# Patient Record
Sex: MALE | Race: WHITE | NOT HISPANIC OR LATINO | Employment: FULL TIME | URBAN - METROPOLITAN AREA
[De-identification: names, ages, dates, MRNs, and addresses within clinical notes are randomized per-mention and may not be internally consistent; named-entity substitution may affect disease eponyms.]

---

## 2017-01-11 ENCOUNTER — PRE VISIT (OUTPATIENT)
Dept: CARDIOLOGY | Facility: CLINIC | Age: 57
End: 2017-01-11

## 2017-01-11 PROBLEM — E78.2 MIXED HYPERLIPIDEMIA: Status: ACTIVE | Noted: 2017-01-11

## 2017-02-06 ENCOUNTER — PRE VISIT (OUTPATIENT)
Dept: CARDIOLOGY | Facility: CLINIC | Age: 57
End: 2017-02-06

## 2017-02-27 ENCOUNTER — OFFICE VISIT (OUTPATIENT)
Dept: CARDIOLOGY | Facility: CLINIC | Age: 57
End: 2017-02-27
Payer: COMMERCIAL

## 2017-02-27 VITALS
SYSTOLIC BLOOD PRESSURE: 120 MMHG | HEIGHT: 66 IN | DIASTOLIC BLOOD PRESSURE: 80 MMHG | HEART RATE: 60 BPM | WEIGHT: 163 LBS | BODY MASS INDEX: 26.2 KG/M2

## 2017-02-27 DIAGNOSIS — E78.5 HYPERLIPIDEMIA WITH TARGET LDL LESS THAN 130: ICD-10-CM

## 2017-02-27 DIAGNOSIS — I10 ESSENTIAL HYPERTENSION, BENIGN: ICD-10-CM

## 2017-02-27 DIAGNOSIS — E78.5 HYPERLIPIDEMIA LDL GOAL <70: ICD-10-CM

## 2017-02-27 DIAGNOSIS — I25.10 CORONARY ARTERY DISEASE INVOLVING NATIVE CORONARY ARTERY OF NATIVE HEART WITHOUT ANGINA PECTORIS: ICD-10-CM

## 2017-02-27 PROCEDURE — 99214 OFFICE O/P EST MOD 30 MIN: CPT | Performed by: INTERNAL MEDICINE

## 2017-02-27 NOTE — MR AVS SNAPSHOT
After Visit Summary   2/27/2017    El Newell    MRN: [id number]           Patient Information     Date Of Birth          1960        Visit Information        Provider Department      2/27/2017 3:45 PM Mariano Garcia MD [facility]        Today's Diagnoses     Hyperlipidemia with target LDL less than 130        Essential hypertension, benign        Coronary artery disease involving native coronary artery of native heart without angina pectoris        Hyperlipidemia LDL goal <70           Follow-ups after your visit        Additional Services     Follow-Up with Cardiologist                 Future tests that were ordered for you today     Open Future Orders        Priority Expected Expires Ordered    Basic metabolic panel Routine 6/27/2017 2/27/2018 2/27/2017    Lipid Profile Routine 6/27/2017 2/27/2018 2/27/2017    ALT Routine 6/27/2017 2/27/2018 2/27/2017    Echocardiogram Routine 6/27/2017 2/27/2018 2/27/2017    Follow-Up with Cardiologist Routine 6/27/2017 2/27/2018 2/27/2017            Who to contact     If you have questions or need follow up information about today's clinic visit or your schedule please contact [facility] directly at [phone].  Normal or non-critical lab and imaging results will be communicated to you by MyChart, letter or phone within 4 business days after the clinic has received the results. If you do not hear from us within 7 days, please contact the clinic through MyChart or phone. If you have a critical or abnormal lab result, we will notify you by phone as soon as possible.  Submit refill requests through [employer] or call your pharmacy and they will forward the refill request to us. Please allow 3 business days for your refill to be completed.          Additional Information About Your Visit        MyChart Information     [employer] gives you secure access to your  "electronic health record. If you see a primary care provider, you can also send messages to your care team and make appointments. If you have questions, please call your primary care clinic.  If you do not have a primary care provider, please call [phone] and they will assist you.        Care EveryWhere ID     This is your Care EveryWhere ID. This could be used by other organizations to access your [facility] medical records  [id number]        Your Vitals Were     Pulse Height BMI (Body Mass Index)             60 1.676 m (5' 6\") 26.31 kg/m2          Blood Pressure from Last 3 Encounters:   02/27/17 120/80   11/23/16 110/74   10/07/16 100/72    Weight from Last 3 Encounters:   02/27/17 73.9 kg (163 lb)   11/23/16 74.7 kg (164 lb 9.6 oz)   10/07/16 74.4 kg (164 lb)              We Performed the Following     Follow-Up with Cardiologist        Primary Care Provider Office Phone # Fax #    Lee Dave [phone]       [facility] [address] [place] [id number]        Thank you!     Thank you for choosing [facility] [place]  for your care. Our goal is always to provide you with excellent care. Hearing back from our patients is one way we can continue to improve our services. Please take a few minutes to complete the written survey that you may receive in the mail after your visit with us. Thank you!             Your Updated Medication List - Protect others around you: Learn how to safely use, store and throw away your medicines at www.disposemymeds.org.          This list is accurate as of: 2/27/17  4:36 PM.  Always use your most recent med list.                   Brand Name Dispense Instructions for use    aspirin 81 MG tablet      Take 81 mg by mouth daily       atorvastatin 40 MG tablet    LIPITOR    90 tablet    Take 1 tablet (40 mg) by mouth daily (with dinner)       ezetimibe 10 MG tablet    ZETIA    90 tablet    Take 1 tablet (10 " mg) by mouth At Bedtime       irbesartan 150 MG tablet    AVAPRO    180 tablet    Take 1 tablet (150 mg) by mouth 2 times daily       spironolactone 25 MG tablet    ALDACTONE    30 tablet    Take 1 tablet (25 mg) by mouth daily       vitamin B complex with vitamin C Tabs tablet      Take 1 tablet by mouth daily

## 2017-02-27 NOTE — LETTER
2/27/2017    Lee Dave MD  [facility]   [address] [facility] [address]    RE: El Newell       Dear Colleague,    I had the pleasure of seeing El Newell in the [facility].    The patient is a 56-year-old white male who initially presented to cardiology clinic for evaluation of an abnormal CT coronary score showing evidence of atherosclerosis, calcified plaque in multiple vessels as well as a history of multiple coronary artery disease risk factors.  He has had perhaps slight symptoms of chest discomfort when he overexerts himself, but not necessarily consistent.  His coronary disease risk factors is positive for cigarette smoking, discontinued in 2006, hypertension since 2013, hyperlipidemia since 2016.  There is no history of diabetes mellitus.  There is a positive family history of coronary disease.  He has 2 to 3 caffeinated beverages per day as well as 1-3 alcoholic beverages a day.      Surgical history includes an L5-S1 fusion that was done in 2011.  There is no evidence of significant medical illness.        He is able to generally participate in activities, both strength training and cardio, without significant restriction.  Blood pressure control has been problematic.  He has been tried on multiple drugs in the past.  He was intolerant to ACE inhibitors, clonidine, labetalol, metoprolol, amlodipine and mostly at higher doses.  CT calcium score was 1145.9, left main coronary artery was 4.8, left anterior descending 24.6, circumflex coronary artery 356.5, right coronary was 460.  There is a 9 mm lung nodule in the right lung.      Echocardiography 2014 showed intact systolic function with ejection fraction 60%-65% with normal right ventricular size and wall motion.  Pulmonary artery pressure was not assessed.  There has been 1 episode of possible loss of consciousness in the past 3 months.  Blood pressures have been extremely high  at one time upwards of 201/131 with him taking clonidine and lorazepam in an effort to lower his blood pressure.  He also had 1 apparent near-syncopal episode or syncopal episode when getting out of bed quickly.        There is no history of myocardial infarction, congestive heart failure, rheumatic heart disease, congenital heart disease or heart murmur.  On exercise stress testing, the patient was able to go 15 minutes on Dimas protocol to a maximum heart rate of 162 which is 98% of maximum predicted heart rate.  Maximum blood pressure 156/72 giving a rate pressure product of approximately 25,000.  There were no EKG changes of ischemia.  There was evidence of a very small area of mild ischemia in the inferior wall.        There is also an ultrasound of the carotids that showed severe disease in the right internal carotid that was greater than 80%.  CT of the neck shows a severe 90% tandem lesion at the ostium and proximal portion of the right internal carotid.  There was mild left common carotid disease with mild left subclavian disease and moderate ostial left vertebral disease.        He subsequently underwent endarterectomy by Dr. Vaibhav Yoder without complications.  He has had peripheral angiography done with the abdominal aorta showing mild atherosclerotic plaque with no aneurysm or stenosis.  Left renal artery had severe 85% stenosis of the origin was treated with PTCA and stent with a 7 x 15 mm stent.      The patient presents to Cardiology Clinic for followup of ischemic heart disease, hypertension and hyperlipidemia.  The patient had a Holter monitor because of his episodic lightheadedness and had isolated to occasional PVCs that were associated with symptoms of lightheadedness.  He also complains still have some sense of either lightheadedness or fatigue or malaise with his medical therapy.  He says that he remains functional and is feeling better than he did with multiple other medications in the past.   He had a neurologic evaluation that demonstrated no apparent neurologic evidence for his episodes.  He has been checked for orthostatic changes which have not been apparent.  He states he gets adequate sleep, but does feel easily fatigued.      PHYSICAL EXAMINATION:   VITAL SIGNS:  Blood pressure 120/80 with a heart rate of 60 and regular.  Weight was 163 pounds, which is stable.   NECK:  Without jugular venous distention, carotid bruit or palpable thyroid.   CHEST:  Essentially clear to percussion and auscultation with slight decreased breath sounds at the bases.   CARDIAC:  Regular rhythm, soft S4 gallop, 1/6 systolic murmur at the left sternal border with minimal radiation.  There was no diastolic murmur, rub or S3.   EXTREMITIES:  Without cyanosis or edema.     Outpatient Encounter Prescriptions as of 2/27/2017   Medication Sig Dispense Refill     ezetimibe (ZETIA) 10 MG tablet Take 1 tablet (10 mg) by mouth At Bedtime 90 tablet 3     atorvastatin (LIPITOR) 40 MG tablet Take 1 tablet (40 mg) by mouth daily (with dinner) 90 tablet 3     spironolactone (ALDACTONE) 25 MG tablet Take 1 tablet (25 mg) by mouth daily 30 tablet 8     vitamin  B complex with vitamin C (VITAMIN  B COMPLEX) TABS Take 1 tablet by mouth daily       irbesartan (AVAPRO) 150 MG tablet Take 1 tablet (150 mg) by mouth 2 times daily 180 tablet 3     aspirin 81 MG tablet Take 81 mg by mouth daily        No facility-administered encounter medications on file as of 2/27/2017.       CLINICAL IMPRESSION:   1.  Stable cardiac condition.   2.  Atherosclerotic heart disease involving all 3 vessels and elevated CT calcium score with multivessel involvement and stress test only showing was a small area of ischemia in the inferior wall.   3.  Hypertension, well controlled.   4.  Hyperlipidemia, at goal.   5.  Positive family history of coronary disease.   6.  History of questionable syncope of unclear etiology.   7.  Degenerative joint disease.   8.  Right  carotid bruit, resolved, status post carotid endarterectomy for severe right internal carotid disease.   9.  Isolated palpitations and irritable rhythm.      DISCUSSION:  The patient has had multiple interventions including PTCA of the renal artery stenosis as well as carotid endarterectomy.  He has only had evidence of very slight ischemia on vigorous stress testing which should be treatable with his medications, although he is not well-tolerant of beta blockers at goal and presently is only on spironolactone and irbesartan, which he is willing to continue to take.  He may be a candidate for further invasive evaluation with cardiac catheterization and coronary angiography if he has any recurrent symptoms.  He does not appear to have any further syncopal episodes.  He needs close followup of serum lipids, basic metabolic panel and blood pressure.  He is willing to try other lower dose drugs at some point in time, but would prefer to stay on present medical regimen at this time.      RECOMMENDATIONS:   1.  Continue present medications.  Consider a very low dose of Bystolic possibly in the future for better blood pressure control and angina treatment, also could consider Ranexa.   2.  Close followup of serum lipids, basic metabolic panel and blood pressure.   3.  Diet and exercise as tolerated.   4.  Echocardiogram in 4-6 months to follow left ventricular function.   5.  Routine medical followup.   6.  Cardiology followup in 4-6 months.     Again, thank you for allowing me to participate in the care of your patient.      Sincerely,    Mariano Garcia MD     [facility] [place]    cc:      Vaibhav Yoder MD    [facility]   [address], [address]   [place] [address]

## 2017-02-28 NOTE — PROGRESS NOTES
HISTORY OF PRESENT ILLNESS:  The patient is a 56-year-old white male who initially presented to cardiology clinic for evaluation of an abnormal CT coronary score showing evidence of atherosclerosis, calcified plaque in multiple vessels as well as a history of multiple coronary artery disease risk factors.  He has had perhaps slight symptoms of chest discomfort when he overexerts himself, but not necessarily consistent.  His coronary disease risk factors is positive for cigarette smoking, discontinued in 2006, hypertension since 2013, hyperlipidemia since 2016.  There is no history of diabetes mellitus.  There is a positive family history of coronary disease.  He has 2 to 3 caffeinated beverages per day as well as 1-3 alcoholic beverages a day.      Surgical history includes an L5-S1 fusion that was done in 2011.  There is no evidence of significant medical illness.        He is able to generally participate in activities, both strength training and cardio, without significant restriction.  Blood pressure control has been problematic.  He has been tried on multiple drugs in the past.  He was intolerant to ACE inhibitors, clonidine, labetalol, metoprolol, amlodipine and mostly at higher doses.  CT calcium score was 1145.9, left main coronary artery was 4.8, left anterior descending 24.6, circumflex coronary artery 356.5, right coronary was 460.  There is a 9 mm lung nodule in the right lung.      Echocardiography 2014 showed intact systolic function with ejection fraction 60%-65% with normal right ventricular size and wall motion.  Pulmonary artery pressure was not assessed.  There has been 1 episode of possible loss of consciousness in the past 3 months.  Blood pressures have been extremely high at one time upwards of 201/131 with him taking clonidine and lorazepam in an effort to lower his blood pressure.  He also had 1 apparent near-syncopal episode or syncopal episode when getting out of bed quickly.        There  is no history of myocardial infarction, congestive heart failure, rheumatic heart disease, congenital heart disease or heart murmur.  On exercise stress testing, the patient was able to go 15 minutes on Dimas protocol to a maximum heart rate of 162 which is 98% of maximum predicted heart rate.  Maximum blood pressure 156/72 giving a rate pressure product of approximately 25,000.  There were no EKG changes of ischemia.  There was evidence of a very small area of mild ischemia in the inferior wall.        There is also an ultrasound of the carotids that showed severe disease in the right internal carotid that was greater than 80%.  CT of the neck shows a severe 90% tandem lesion at the ostium and proximal portion of the right internal carotid.  There was mild left common carotid disease with mild left subclavian disease and moderate ostial left vertebral disease.        He subsequently underwent endarterectomy by Dr. Vaibhav Yoder without complications.  He has had peripheral angiography done with the abdominal aorta showing mild atherosclerotic plaque with no aneurysm or stenosis.  Left renal artery had severe 85% stenosis of the origin was treated with PTCA and stent with a 7 x 15 mm stent.      The patient presents to Cardiology Clinic for followup of ischemic heart disease, hypertension and hyperlipidemia.  The patient had a Holter monitor because of his episodic lightheadedness and had isolated to occasional PVCs that were associated with symptoms of lightheadedness.  He also complains still have some sense of either lightheadedness or fatigue or malaise with his medical therapy.  He says that he remains functional and is feeling better than he did with multiple other medications in the past.  He had a neurologic evaluation that demonstrated no apparent neurologic evidence for his episodes.  He has been checked for orthostatic changes which have not been apparent.  He states he gets adequate sleep, but does feel  easily fatigued.      PHYSICAL EXAMINATION:   VITAL SIGNS:  Blood pressure 120/80 with a heart rate of 60 and regular.  Weight was 163 pounds, which is stable.   NECK:  Without jugular venous distention, carotid bruit or palpable thyroid.   CHEST:  Essentially clear to percussion and auscultation with slight decreased breath sounds at the bases.   CARDIAC:  Regular rhythm, soft S4 gallop, 1/6 systolic murmur at the left sternal border with minimal radiation.  There was no diastolic murmur, rub or S3.   EXTREMITIES:  Without cyanosis or edema.      CLINICAL IMPRESSION:   1.  Stable cardiac condition.   2.  Atherosclerotic heart disease involving all 3 vessels and elevated CT calcium score with multivessel involvement and stress test only showing was a small area of ischemia in the inferior wall.   3.  Hypertension, well controlled.   4.  Hyperlipidemia, at goal.   5.  Positive family history of coronary disease.   6.  History of questionable syncope of unclear etiology.   7.  Degenerative joint disease.   8.  Right carotid bruit, resolved, status post carotid endarterectomy for severe right internal carotid disease.   9.  Isolated palpitations and irritable rhythm.      DISCUSSION:  The patient has had multiple interventions including PTCA of the renal artery stenosis as well as carotid endarterectomy.  He has only had evidence of very slight ischemia on vigorous stress testing which should be treatable with his medications, although he is not well-tolerant of beta blockers at goal and presently is only on spironolactone and irbesartan, which he is willing to continue to take.  He may be a candidate for further invasive evaluation with cardiac catheterization and coronary angiography if he has any recurrent symptoms.  He does not appear to have any further syncopal episodes.  He needs close followup of serum lipids, basic metabolic panel and blood pressure.  He is willing to try other lower dose drugs at some point  in time, but would prefer to stay on present medical regimen at this time.      RECOMMENDATIONS:   1.  Continue present medications.  Consider a very low dose of Bystolic possibly in the future for better blood pressure control and angina treatment, also could consider Ranexa.   2.  Close followup of serum lipids, basic metabolic panel and blood pressure.   3.  Diet and exercise as tolerated.   4.  Echocardiogram in 4-6 months to follow left ventricular function.   5.  Routine medical followup.   6.  Cardiology followup in 4-6 months.      cc:      Lee Dave MD    [facility]   [address] [address]      Vaibhav Yoder MD    [facility]   [address]   [place] [address]              MARANDA HARRIS MD, [facility]             D: 2017 17:24   T: 2017 18:31   MT: KAVON      Name:     KYLE ROSAS   MRN:      [id number]        Account:      [id number]   :      1960           Service Date: 2017      Document: [id number]

## 2017-05-11 DIAGNOSIS — I10 ESSENTIAL HYPERTENSION, BENIGN: ICD-10-CM

## 2017-05-11 RX ORDER — SPIRONOLACTONE 25 MG/1
TABLET ORAL
Qty: 90 TABLET | Refills: 0 | Status: SHIPPED | OUTPATIENT
Start: 2017-05-11 | End: 2017-09-27

## 2017-05-11 NOTE — TELEPHONE ENCOUNTER
spironolactone      Last Written Prescription Date: 10/24/16  Last Fill Quantity: 30, # refills: 8  Last Office Visit with [facility] [place], [facility] or [facility] prescribing provider: 6/10/16  Next 5 appointments (look out 90 days)     Jul 14, 2017  8:15 AM CDT   Return Visit with Mariano Garcia MD   [facility] [place] ([facility])    [address] [place] [id number] [phone]                   Potassium   Date Value Ref Range Status   10/07/2016 4.5 3.5 - 5.1 mmol/L Final     Creatinine   Date Value Ref Range Status   10/07/2016 1.17 0.70 - 1.30 mg/dL Final     BP Readings from Last 3 Encounters:   02/27/17 120/80   11/23/16 110/74   10/07/16 100/72

## 2017-08-07 DIAGNOSIS — I10 ESSENTIAL HYPERTENSION, BENIGN: ICD-10-CM

## 2017-08-08 RX ORDER — SPIRONOLACTONE 25 MG/1
TABLET ORAL
Qty: 90 TABLET | Refills: 1 | Status: SHIPPED | OUTPATIENT
Start: 2017-08-08 | End: 2017-09-27

## 2017-08-08 NOTE — TELEPHONE ENCOUNTER
Spironolactone 25 mg tablet       Last Written Prescription Date: 5/11/17  Last Fill Quantity: 90, # refills: 0  Last Office Visit with [facility], [facility] or [facility] prescribing provider: 5/11/17  Next 5 appointments (look out 90 days)     Sep 27, 2017  3:15 PM CDT   Return Visit with Mariano Garcia MD   [facility] ([facility])    [address] [place] [id number] [phone]                   Potassium   Date Value Ref Range Status   10/07/2016 4.5 3.5 - 5.1 mmol/L Final     Creatinine   Date Value Ref Range Status   10/07/2016 1.17 0.70 - 1.30 mg/dL Final     BP Readings from Last 3 Encounters:   02/27/17 120/80   11/23/16 110/74   10/07/16 100/72

## 2017-09-05 DIAGNOSIS — E78.5 HYPERLIPIDEMIA WITH TARGET LDL LESS THAN 130: ICD-10-CM

## 2017-09-05 RX ORDER — IRBESARTAN 150 MG/1
150 TABLET ORAL 2 TIMES DAILY
Qty: 180 TABLET | Refills: 1 | Status: SHIPPED | OUTPATIENT
Start: 2017-09-05 | End: 2017-09-14

## 2017-09-12 ENCOUNTER — PRE VISIT (OUTPATIENT)
Dept: CARDIOLOGY | Facility: CLINIC | Age: 57
End: 2017-09-12

## 2017-09-14 DIAGNOSIS — E78.5 HYPERLIPIDEMIA WITH TARGET LDL LESS THAN 130: ICD-10-CM

## 2017-09-14 RX ORDER — IRBESARTAN 300 MG/1
150 TABLET ORAL 2 TIMES DAILY
Qty: 90 TABLET | Refills: 3 | Status: SHIPPED | OUTPATIENT
Start: 2017-09-14 | End: 2018-09-19

## 2017-09-20 ENCOUNTER — HOSPITAL ENCOUNTER (OUTPATIENT)
Dept: CARDIOLOGY | Facility: CLINIC | Age: 57
Discharge: HOME | End: 2017-09-20
Attending: INTERNAL MEDICINE | Admitting: INTERNAL MEDICINE
Payer: COMMERCIAL

## 2017-09-20 DIAGNOSIS — E78.5 HYPERLIPIDEMIA LDL GOAL <70: ICD-10-CM

## 2017-09-20 DIAGNOSIS — I10 ESSENTIAL HYPERTENSION, BENIGN: ICD-10-CM

## 2017-09-20 DIAGNOSIS — E78.5 HYPERLIPIDEMIA WITH TARGET LDL LESS THAN 130: ICD-10-CM

## 2017-09-20 DIAGNOSIS — I25.10 CORONARY ARTERY DISEASE INVOLVING NATIVE CORONARY ARTERY OF NATIVE HEART WITHOUT ANGINA PECTORIS: ICD-10-CM

## 2017-09-20 LAB
ALT SERPL W P-5'-P-CCNC: 28 U/L (ref 5–30)
ANION GAP SERPL CALCULATED.3IONS-SCNC: 13.1 MMOL/L (ref 6–17)
BUN SERPL-MCNC: 15 MG/DL (ref 7–30)
CALCIUM SERPL-MCNC: 9.9 MG/DL (ref 8.5–10.5)
CHLORIDE SERPL-SCNC: 98 MMOL/L (ref 98–107)
CHOLEST SERPL-MCNC: 161 MG/DL
CO2 SERPL-SCNC: 28 MMOL/L (ref 23–29)
CREAT SERPL-MCNC: 1.16 MG/DL (ref 0.7–1.3)
GFR SERPL CREATININE-BSD FRML MDRD: 65 ML/MIN/1.7M2
GLUCOSE SERPL-MCNC: 92 MG/DL (ref 70–105)
HDLC SERPL-MCNC: 72 MG/DL
LDLC SERPL CALC-MCNC: 71 MG/DL
NONHDLC SERPL-MCNC: 89 MG/DL
POTASSIUM SERPL-SCNC: 4.1 MMOL/L (ref 3.5–5.1)
SODIUM SERPL-SCNC: 135 MMOL/L (ref 136–145)
TRIGL SERPL-MCNC: 92 MG/DL

## 2017-09-20 PROCEDURE — 93306 TTE W/DOPPLER COMPLETE: CPT | Mod: 26 | Performed by: INTERNAL MEDICINE

## 2017-09-20 PROCEDURE — 80061 LIPID PANEL: CPT | Performed by: INTERNAL MEDICINE

## 2017-09-20 PROCEDURE — 80048 BASIC METABOLIC PNL TOTAL CA: CPT | Performed by: INTERNAL MEDICINE

## 2017-09-20 PROCEDURE — 84460 ALANINE AMINO (ALT) (SGPT): CPT | Performed by: INTERNAL MEDICINE

## 2017-09-20 PROCEDURE — 36415 COLL VENOUS BLD VENIPUNCTURE: CPT | Performed by: INTERNAL MEDICINE

## 2017-09-20 PROCEDURE — 93306 TTE W/DOPPLER COMPLETE: CPT

## 2017-09-21 ENCOUNTER — TELEPHONE (OUTPATIENT)
Dept: CARDIOLOGY | Facility: CLINIC | Age: 57
End: 2017-09-21

## 2017-09-22 NOTE — TELEPHONE ENCOUNTER
[facility] Prior Authorization Team   Phone: [phone]  Fax: [phone]      PA Initiation    Medication: irbesartan (AVAPRO) 300 MG tablet  Insurance Company: [employer] - Phone [phone] Fax [phone]  Pharmacy Filling the Rx: [facility] [address] [place] [address] [address] [facility]  Filling Pharmacy Phone: [phone]  Filling Pharmacy Fax: [phone]  Start Date: 9/22/2017

## 2017-09-27 ENCOUNTER — OFFICE VISIT (OUTPATIENT)
Dept: CARDIOLOGY | Facility: CLINIC | Age: 57
End: 2017-09-27
Attending: INTERNAL MEDICINE
Payer: COMMERCIAL

## 2017-09-27 VITALS
SYSTOLIC BLOOD PRESSURE: 131 MMHG | HEART RATE: 76 BPM | BODY MASS INDEX: 26.16 KG/M2 | DIASTOLIC BLOOD PRESSURE: 81 MMHG | WEIGHT: 162.8 LBS | HEIGHT: 66 IN

## 2017-09-27 DIAGNOSIS — I25.10 CORONARY ARTERY DISEASE INVOLVING NATIVE CORONARY ARTERY OF NATIVE HEART WITHOUT ANGINA PECTORIS: ICD-10-CM

## 2017-09-27 DIAGNOSIS — I10 ESSENTIAL HYPERTENSION, BENIGN: ICD-10-CM

## 2017-09-27 DIAGNOSIS — E78.5 HYPERLIPIDEMIA LDL GOAL <70: ICD-10-CM

## 2017-09-27 DIAGNOSIS — E78.5 HYPERLIPIDEMIA WITH TARGET LDL LESS THAN 130: ICD-10-CM

## 2017-09-27 PROCEDURE — 99214 OFFICE O/P EST MOD 30 MIN: CPT | Performed by: INTERNAL MEDICINE

## 2017-09-27 RX ORDER — CHLORAL HYDRATE 500 MG
1 CAPSULE ORAL DAILY
COMMUNITY
End: 2019-12-04

## 2017-09-27 NOTE — LETTER
9/27/2017    Lee Dave MD  [address] [facility] [address]    RE: El Newell       Dear Colleague,    I had the pleasure of seeing El Newell in the [facility].    The patient is a 57-year-old white male who initially presented to Cardiology Clinic for evaluation of an abnormal CT coronary angiogram showing evidence of atherosclerosis with calcified plaque in multiple vessels as well as a history of multiple coronary artery disease risk factors.  He has had very slight symptoms of chest discomfort when he overexerted himself, but not necessarily in a consistent manner.  Coronary disease risk factors is positive for cigarette smoking, discontinued in 2006, hypertension since 2013 and hyperlipidemia since 2016.  There is no history of documented diabetes mellitus but there was a positive family history of coronary artery disease.  He would drink 2-3 caffeinated beverages a day as well as 1-3 alcoholic beverages a day.      Surgical history included an L5-S1 fusion was done in 2011 with no evidence of significant other medical illness.        He has been able to be generally participate in activities, both strength training and cardio without restriction.  Blood pressure has been at times problematic.  He has been on multiple drugs in the past with intolerance to ACE inhibitor, clonidine, labetalol, metoprolol, amlodipine, mostly at higher doses.      A CT calcium score was 1145 with left main being 4.8, left anterior descending 24.6, circumflex, 356.5 and right coronary 460.  There was a 9 mm nodule in the right lung.        Echocardiography this month demonstrated a normal-sized left ventricle with normal wall thickness, intact systolic function with ejection fraction 60%-65% with no regional wall motion abnormality present.  There was mild mitral insufficiency, trace aortic insufficiency with sinus bradycardia present.        There has been no history  of documented myocardial infarction, congestive heart failure, rheumatic heart disease, congenital heart disease or heart murmur on previous exercise testing.  He was able to go 15 minutes on Dimas protocol to a maximum heart rate of 162, which is 98% of maximum predicted heart rate.  Maximum blood pressure 156/72 with a rate pressure product of approximately 25,000 with no EKG changes of ischemia with the slightest area of mild ischemia in the inferior wall.        Ultrasound of carotids had shown severe disease in the right internal carotid to greater than 80%.  CT of the neck showed a severe 90% lesion in the ostium and proximal portion of the right internal carotid.  There was mild left carotid disease with mild left subclavian disease and moderate ostial left vertebral disease.        He underwent a carotid endarterectomy without complication.  He has also had peripheral angiography done with the abdominal aorta showing mild atherosclerotic plaque but no aneurysm or stenosis.  Left renal artery had severe 85% stenosis at the origin, which was treated with PTCA and stent without complication.      The patient presents to Cardiology Clinic for followup of his ischemic heart disease, hypertension, and hyperlipidemia.  Holter monitor had been done because of episode of lightheadedness and some isolated to occasional PVCs.  At worse he will get lightheadedness.  He also had been placed on spironolactone for additional blood pressure control.  This was discontinued and his lightheadedness improved.  Neurologic exam was initiated because of lightheadedness and only isolated PVCs with no neurologic pathology noted and no orthostatic change in position.  He does get adequate sleep and feels somewhat weak and fatigued.      PHYSICAL EXAMINATION:   VITAL SIGNS:  Showed a blood pressure 130/80 with a heart rate of 70-80 and regular.  Weight was 162 pounds, which is stable.   NECK:  Without jugular venous distention,  obvious carotid bruit or palpable thyroid.   CHEST:  Essentially clear to percussion and auscultation, with slight decreased breath sounds at the bases.   CARDIAC:  Regular rhythm, soft S4 gallop, 1/6 systolic murmur at the left sternal border with minimal radiation.  No diastolic murmur, rub or S3.   EXTREMITIES:  Without cyanosis or edema.     Outpatient Encounter Prescriptions as of 9/27/2017   Medication Sig Dispense Refill     Coenzyme Q10 (CO Q 10) 100 MG CAPS Take by mouth daily       acetylcysteine (NAC) 500 MG CAPS capsule Take 500 mg by mouth daily       fish oil-omega-3 fatty acids 1000 MG capsule Take 1 g by mouth daily       Probiotic Product (PROBIOTIC ADVANCED) CAPS Take by mouth daily       irbesartan (AVAPRO) 300 MG tablet Take 0.5 tablets (150 mg) by mouth 2 times daily 90 tablet 3     atorvastatin (LIPITOR) 40 MG tablet Take 1 tablet (40 mg) by mouth daily (with dinner) 90 tablet 3     [DISCONTINUED] ezetimibe (ZETIA) 10 MG tablet Take 1 tablet (10 mg) by mouth At Bedtime 90 tablet 3     vitamin  B complex with vitamin C (VITAMIN  B COMPLEX) TABS Take 1 tablet by mouth daily       aspirin 81 MG tablet Take 81 mg by mouth daily        [DISCONTINUED] spironolactone (ALDACTONE) 25 MG tablet TAKE 1 TABLET(25 MG) BY MOUTH DAILY 90 tablet 1     [DISCONTINUED] spironolactone (ALDACTONE) 25 MG tablet TAKE 1 TABLET(25 MG) BY MOUTH DAILY 90 tablet 0     No facility-administered encounter medications on file as of 9/27/2017.       CLINICAL IMPRESSION:   1.  Stable cardiac condition.   2.  Arteriosclerotic cardiovascular disease with plaque involving 2 vessels with elevated CT calcium score and stress testing showing slight ischemia in the inferior wall.   3.  Hypertension, adequate control.   4.  Hyperlipidemia discussion goal.   5.  Positive family history of coronary disease.   6.  History of possible syncope of unclear etiology.   7.  Degenerative joint disease.   8.  Peripheral vascular disease with  right carotid bruit, status post carotid endarterectomy for severe right internal carotid disease.   9.  Isolated palpitations.      The patient's medications at the present time include:     1.  Coenzyme Q10, 100 mg a day.   2.  Nac-Acetyl cysteine 500 mg a day.   2.  Fish Omega 3 acid 1 gram per day.   3.  Probiotic 1 per day.   4.  Avapro 150 mg twice a day.   5.  Zetia 10 mg a day.   6.  Atorvastatin 40 mg a day.   7.  Vitamin B complex 1 tablet a day.   8.  Aspirin 81 mg a day.      LABORATORY DATA:  Showed a cholesterol of 151, HDL 72, LDL 71, triglycerides 92.  Sodium 135, potassium 4.1, BUN 15, creatinine 1.16.  ALT is 28.      DISCUSSION:  The patient has done well from a cardiac viewpoint.  He has had no significant symptoms of angina pectoris or congestive heart failure.  He will require followup of his carotid endarterectomy by Vascular Surgery with probable carotid ultrasound.  He is a candidate for a stress echocardiogram in early 2018 to follow ischemic status of the myocardium and medical efficacy and functional capacity.  He has been intolerant to multiple medications that would normally be used for his ischemic heart disease at this time.  Blood pressure appears reasonably well controlled.  He does tend to work extra hours and put additional stress on himself, but has not had significant symptoms.  He will need close followup of his serum lipids, basic metabolic panel and blood pressure.  Otherwise, he appears to be stable and satisfied with his lifestyle.      RECOMMENDATIONS:   1.  Continue present medications.   2.  Close followup of serum lipids, basic metabolic panel and blood pressure.   3.  Diet and exercise as tolerated.     4.  Stress echocardiography in 2018 to follow the ischemic status of the myocardium and functional capacity.   4.  Routine medical followup.   5.  Cardiology followup in 6 months.            Again, thank you for allowing me to participate in the care of your patient.       Sincerely,    Mariano Garcia MD     [facility]    cc:       Vaibhav Yoder MD    [facility]   [address], [address]   [place] [address]

## 2017-09-27 NOTE — MR AVS SNAPSHOT
After Visit Summary   9/27/2017    El Newell    MRN: [id number]           Patient Information     Date Of Birth          1960        Visit Information        Provider Department      9/27/2017 3:15 PM Mariano Garcia MD [facility]        Today's Diagnoses     Hyperlipidemia with target LDL less than 130        Essential hypertension, benign        Coronary artery disease involving native coronary artery of native heart without angina pectoris        Hyperlipidemia LDL goal <70           Follow-ups after your visit        Additional Services     Follow-Up with Cardiologist                 Future tests that were ordered for you today     Open Future Orders        Priority Expected Expires Ordered    Basic metabolic panel Routine 3/26/2018 9/27/2018 9/27/2017    Lipid Profile Routine 3/26/2018 9/27/2018 9/27/2017    ALT Routine 3/26/2018 9/27/2018 9/27/2017    Exercise Stress Echocardiogram Routine 3/26/2018 9/27/2018 9/27/2017    Follow-Up with Cardiologist Routine 3/26/2018 9/27/2018 9/27/2017            Who to contact     If you have questions or need follow up information about today's clinic visit or your schedule please contact [facility] directly at [phone].  Normal or non-critical lab and imaging results will be communicated to you by MyChart, letter or phone within 4 business days after the clinic has received the results. If you do not hear from us within 7 days, please contact the clinic through MyChart or phone. If you have a critical or abnormal lab result, we will notify you by phone as soon as possible.  Submit refill requests through [employer] or call your pharmacy and they will forward the refill request to us. Please allow 3 business days for your refill to be completed.          Additional Information About Your Visit        [employer] Information     [employer] gives you secure access  "to your electronic health record. If you see a primary care provider, you can also send messages to your care team and make appointments. If you have questions, please call your primary care clinic.  If you do not have a primary care provider, please call [phone] and they will assist you.        Care EveryWhere ID     This is your Care EveryWhere ID. This could be used by other organizations to access your [place] medical records  [id number]        Your Vitals Were     Pulse Height BMI (Body Mass Index)             76 1.676 m (5' 6\") 26.28 kg/m2          Blood Pressure from Last 3 Encounters:   09/27/17 131/81   02/27/17 120/80   11/23/16 110/74    Weight from Last 3 Encounters:   09/27/17 73.8 kg (162 lb 12.8 oz)   02/27/17 73.9 kg (163 lb)   11/23/16 74.7 kg (164 lb 9.6 oz)              We Performed the Following     Follow-Up with Cardiologist        Primary Care Provider Office Phone # Fax #    Lee Dave [phone]       [address] [place] [id number]        Equal Access to Services     [facility] Hospital[facility]ISTEL : Hadii aad ku hadasho Soomaali, waaxda luqadaha, qaybta kaalmada adeegyada, mariza carrizales. So [facility] [phone].    ATENCIÓN: Si habla español, tiene a german disposición servicios gratuitos de asistencia lingüística. Llame al [phone].    We comply with applicable federal civil rights laws and [place] laws. We do not discriminate on the basis of race, color, national origin, age, disability sex, sexual orientation or gender identity.            Thank you!     Thank you for choosing [facility]  for your care. Our goal is always to provide you with excellent care. Hearing back from our patients is one way we can continue to improve our services. Please take a few minutes to complete the written survey that you may receive in the mail after your visit with us. Thank you!             Your Updated " Medication List - Protect others around you: Learn how to safely use, store and throw away your medicines at www.disposemymeds.org.          This list is accurate as of: 9/27/17  3:58 PM.  Always use your most recent med list.                   Brand Name Dispense Instructions for use Diagnosis    aspirin 81 MG tablet      Take 81 mg by mouth daily    Essential hypertension, benign       atorvastatin 40 MG tablet    LIPITOR    90 tablet    Take 1 tablet (40 mg) by mouth daily (with dinner)    Hyperlipidemia LDL goal <70       Co Q 10 100 MG Caps      Take by mouth daily        ezetimibe 10 MG tablet    ZETIA    90 tablet    Take 1 tablet (10 mg) by mouth At Bedtime    Hyperlipidemia LDL goal <70       fish oil-omega-3 fatty acids 1000 MG capsule      Take 1 g by mouth daily        irbesartan 300 MG tablet    AVAPRO    90 tablet    Take 0.5 tablets (150 mg) by mouth 2 times daily    Hyperlipidemia with target LDL less than 130        MG Caps capsule   Generic drug:  acetylcysteine      Take 500 mg by mouth daily        PROBIOTIC ADVANCED Caps      Take by mouth daily        vitamin B complex with vitamin C Tabs tablet      Take 1 tablet by mouth daily

## 2017-09-27 NOTE — PROGRESS NOTES
HISTORY OF PRESENT ILLNESS:  The patient is a 57-year-old white male who initially presented to Cardiology Clinic for evaluation of an abnormal CT coronary angiogram showing evidence of atherosclerosis with calcified plaque in multiple vessels as well as a history of multiple coronary artery disease risk factors.  He has had very slight symptoms of chest discomfort when he overexerted himself, but not necessarily in a consistent manner.  Coronary disease risk factors is positive for cigarette smoking, discontinued in 2006, hypertension since 2013 and hyperlipidemia since 2016.  There is no history of documented diabetes mellitus but there was a positive family history of coronary artery disease.  He would drink 2-3 caffeinated beverages a day as well as 1-3 alcoholic beverages a day.      Surgical history included an L5-S1 fusion was done in 2011 with no evidence of significant other medical illness.        He has been able to be generally participate in activities, both strength training and cardio without restriction.  Blood pressure has been at times problematic.  He has been on multiple drugs in the past with intolerance to ACE inhibitor, clonidine, labetalol, metoprolol, amlodipine, mostly at higher doses.      A CT calcium score was 1145 with left main being 4.8, left anterior descending 24.6, circumflex, 356.5 and right coronary 460.  There was a 9 mm nodule in the right lung.        Echocardiography this month demonstrated a normal-sized left ventricle with normal wall thickness, intact systolic function with ejection fraction 60%-65% with no regional wall motion abnormality present.  There was mild mitral insufficiency, trace aortic insufficiency with sinus bradycardia present.        There has been no history of documented myocardial infarction, congestive heart failure, rheumatic heart disease, congenital heart disease or heart murmur on previous exercise testing.  He was able to go 15 minutes on Dimas  protocol to a maximum heart rate of 162, which is 98% of maximum predicted heart rate.  Maximum blood pressure 156/72 with a rate pressure product of approximately 25,000 with no EKG changes of ischemia with the slightest area of mild ischemia in the inferior wall.        Ultrasound of carotids had shown severe disease in the right internal carotid to greater than 80%.  CT of the neck showed a severe 90% lesion in the ostium and proximal portion of the right internal carotid.  There was mild left carotid disease with mild left subclavian disease and moderate ostial left vertebral disease.        He underwent a carotid endarterectomy without complication.  He has also had peripheral angiography done with the abdominal aorta showing mild atherosclerotic plaque but no aneurysm or stenosis.  Left renal artery had severe 85% stenosis at the origin, which was treated with PTCA and stent without complication.      The patient presents to Cardiology Clinic for followup of his ischemic heart disease, hypertension, and hyperlipidemia.  Holter monitor had been done because of episode of lightheadedness and some isolated to occasional PVCs.  At worse he will get lightheadedness.  He also had been placed on spironolactone for additional blood pressure control.  This was discontinued and his lightheadedness improved.  Neurologic exam was initiated because of lightheadedness and only isolated PVCs with no neurologic pathology noted and no orthostatic change in position.  He does get adequate sleep and feels somewhat weak and fatigued.      PHYSICAL EXAMINATION:   VITAL SIGNS:  Showed a blood pressure 130/80 with a heart rate of 70-80 and regular.  Weight was 162 pounds, which is stable.   NECK:  Without jugular venous distention, obvious carotid bruit or palpable thyroid.   CHEST:  Essentially clear to percussion and auscultation, with slight decreased breath sounds at the bases.   CARDIAC:  Regular rhythm, soft S4 gallop, 1/6  systolic murmur at the left sternal border with minimal radiation.  No diastolic murmur, rub or S3.   EXTREMITIES:  Without cyanosis or edema.      CLINICAL IMPRESSION:   1.  Stable cardiac condition.   2.  Arteriosclerotic cardiovascular disease with plaque involving 2 vessels with elevated CT calcium score and stress testing showing slight ischemia in the inferior wall.   3.  Hypertension, adequate control.   4.  Hyperlipidemia discussion goal.   5.  Positive family history of coronary disease.   6.  History of possible syncope of unclear etiology.   7.  Degenerative joint disease.   8.  Peripheral vascular disease with right carotid bruit, status post carotid endarterectomy for severe right internal carotid disease.   9.  Isolated palpitations.      The patient's medications at the present time include:     1.  Coenzyme Q10, 100 mg a day.   2.  Nac-Acetyl cysteine 500 mg a day.   2.  Fish Omega 3 acid 1 gram per day.   3.  Probiotic 1 per day.   4.  Avapro 150 mg twice a day.   5.  Zetia 10 mg a day.   6.  Atorvastatin 40 mg a day.   7.  Vitamin B complex 1 tablet a day.   8.  Aspirin 81 mg a day.      LABORATORY DATA:  Showed a cholesterol of 151, HDL 72, LDL 71, triglycerides 92.  Sodium 135, potassium 4.1, BUN 15, creatinine 1.16.  ALT is 28.      DISCUSSION:  The patient has done well from a cardiac viewpoint.  He has had no significant symptoms of angina pectoris or congestive heart failure.  He will require followup of his carotid endarterectomy by Vascular Surgery with probable carotid ultrasound.  He is a candidate for a stress echocardiogram in early 2018 to follow ischemic status of the myocardium and medical efficacy and functional capacity.  He has been intolerant to multiple medications that would normally be used for his ischemic heart disease at this time.  Blood pressure appears reasonably well controlled.  He does tend to work extra hours and put additional stress on himself, but has not had  significant symptoms.  He will need close followup of his serum lipids, basic metabolic panel and blood pressure.  Otherwise, he appears to be stable and satisfied with his lifestyle.      RECOMMENDATIONS:   1.  Continue present medications.   2.  Close followup of serum lipids, basic metabolic panel and blood pressure.   3.  Diet and exercise as tolerated.     4.  Stress echocardiography in 2018 to follow the ischemic status of the myocardium and functional capacity.   4.  Routine medical followup.   5.  Cardiology followup in 6 months.      cc:      Lee Dave MD    [facility]   [address] [place] [address]       Vaibhav Yoder MD    [facility]   [address], [address]   [place] [address]         MARANDA HARRIS MD, [facility]             D: 2017 16:34   T: 2017 17:23   MT: KAVON      Name:     KYLE ROSAS   MRN:      [id number]        Account:      [id number]   :      1960           Service Date: 2017      Document: [id number]

## 2017-10-30 DIAGNOSIS — E78.5 HYPERLIPIDEMIA LDL GOAL <70: ICD-10-CM

## 2017-10-30 RX ORDER — EZETIMIBE 10 MG/1
10 TABLET ORAL AT BEDTIME
Qty: 90 TABLET | Refills: 3 | Status: SHIPPED | OUTPATIENT
Start: 2017-10-30 | End: 2018-11-28

## 2017-11-15 ENCOUNTER — TELEPHONE (OUTPATIENT)
Dept: INTERNAL MEDICINE | Facility: CLINIC | Age: 57
End: 2017-11-15

## 2017-12-12 ENCOUNTER — HOSPITAL ENCOUNTER (OUTPATIENT)
Dept: ULTRASOUND IMAGING | Facility: CLINIC | Age: 57
End: 2017-12-12
Attending: SURGERY
Payer: COMMERCIAL

## 2017-12-12 ENCOUNTER — HOSPITAL ENCOUNTER (OUTPATIENT)
Dept: ULTRASOUND IMAGING | Facility: CLINIC | Age: 57
Discharge: HOME | End: 2017-12-12
Attending: SURGERY | Admitting: SURGERY
Payer: COMMERCIAL

## 2017-12-12 DIAGNOSIS — I65.21 STENOSIS OF RIGHT CAROTID ARTERY: ICD-10-CM

## 2017-12-12 DIAGNOSIS — I70.1 RENAL ARTERY STENOSIS (H): ICD-10-CM

## 2017-12-12 PROCEDURE — 93880 EXTRACRANIAL BILAT STUDY: CPT

## 2017-12-12 PROCEDURE — 76775 US EXAM ABDO BACK WALL LIM: CPT

## 2017-12-14 ENCOUNTER — OFFICE VISIT (OUTPATIENT)
Dept: OTHER | Facility: CLINIC | Age: 57
End: 2017-12-14
Attending: SURGERY
Payer: COMMERCIAL

## 2017-12-14 VITALS — HEART RATE: 65 BPM | SYSTOLIC BLOOD PRESSURE: 154 MMHG | DIASTOLIC BLOOD PRESSURE: 90 MMHG

## 2017-12-14 DIAGNOSIS — Z48.812 POSTOP CAROTID ENDARTERECTOMY SURVEILLANCE, ENCOUNTER FOR: Primary | ICD-10-CM

## 2017-12-14 DIAGNOSIS — I15.0 RENOVASCULAR HYPERTENSION: ICD-10-CM

## 2017-12-14 DIAGNOSIS — I71.40 ABDOMINAL AORTIC ANEURYSM (AAA) WITHOUT RUPTURE (H): ICD-10-CM

## 2017-12-14 PROCEDURE — 99211 OFF/OP EST MAY X REQ PHY/QHP: CPT

## 2017-12-14 PROCEDURE — 99214 OFFICE O/P EST MOD 30 MIN: CPT | Mod: ZP | Performed by: SURGERY

## 2017-12-14 NOTE — MR AVS SNAPSHOT
After Visit Summary   12/14/2017    El Newell    MRN: [id number]           Patient Information     Date Of Birth          1960        Visit Information        Provider Department      12/14/2017 3:45 PM Vaibhav Yoder MD [facility] [place]      Today's Diagnoses     Postop carotid endarterectomy surveillance, encounter for    -  1    Renovascular hypertension        Abdominal aortic aneurysm (AAA) without rupture (H)           Follow-ups after your visit        Follow-up notes from your care team     Return in about 18 months (around 6/14/2019).      Who to contact     If you have questions or need follow up information about today's clinic visit or your schedule please contact [facility] directly at [phone].  Normal or non-critical lab and imaging results will be communicated to you by MyChart, letter or phone within 4 business days after the clinic has received the results. If you do not hear from us within 7 days, please contact the clinic through MyChart or phone. If you have a critical or abnormal lab result, we will notify you by phone as soon as possible.  Submit refill requests through [employer] or call your pharmacy and they will forward the refill request to us. Please allow 3 business days for your refill to be completed.          Additional Information About Your Visit        MyChart Information     [employer] gives you secure access to your electronic health record. If you see a primary care provider, you can also send messages to your care team and make appointments. If you have questions, please call your primary care clinic.  If you do not have a primary care provider, please call [phone] and they will assist you.        Care EveryWhere ID     This is your Care EveryWhere ID. This could be used by other organizations to access your [facility] medical records  [id number]         Your Vitals Were     Pulse                   65            Blood Pressure from Last 3 Encounters:   12/14/17 154/90   09/27/17 131/81   02/27/17 120/80    Weight from Last 3 Encounters:   09/27/17 162 lb 12.8 oz (73.8 kg)   02/27/17 163 lb (73.9 kg)   11/23/16 164 lb 9.6 oz (74.7 kg)              Today, you had the following     No orders found for display       Primary Care Provider Office Phone # Fax #    Lee Dave [phone]       [address] [facility] [address]        Equal Access to Services     [facility]: Hadii monica martin hadasho Socarolyn, waaxda luqadaha, qaybta kaalmada adegraysonyada, mariza mcguire . So [facility] [phone].    ATENCIÓN: Si habla español, tiene a german disposición servicios gratuitos de asistencia lingüística. Llame al [phone].    We comply with applicable federal civil rights laws and [place] laws. We do not discriminate on the basis of race, color, national origin, age, disability, sex, sexual orientation, or gender identity.            Thank you!     Thank you for choosing [facility] [place]  for your care. Our goal is always to provide you with excellent care. Hearing back from our patients is one way we can continue to improve our services. Please take a few minutes to complete the written survey that you may receive in the mail after your visit with us. Thank you!             Your Updated Medication List - Protect others around you: Learn how to safely use, store and throw away your medicines at www.disposemymeds.org.          This list is accurate as of: 12/14/17  5:09 PM.  Always use your most recent med list.                   Brand Name Dispense Instructions for use Diagnosis    aspirin 81 MG tablet      Take 81 mg by mouth daily    Essential hypertension, benign       atorvastatin 40 MG tablet    LIPITOR    90 tablet    Take 1 tablet (40 mg) by mouth daily (with dinner)    Hyperlipidemia LDL goal <70        Co Q 10 100 MG Caps      Take by mouth daily        ezetimibe 10 MG tablet    ZETIA    90 tablet    Take 1 tablet (10 mg) by mouth At Bedtime    Hyperlipidemia LDL goal <70       fish oil-omega-3 fatty acids 1000 MG capsule      Take 1 g by mouth daily        irbesartan 300 MG tablet    AVAPRO    90 tablet    Take 0.5 tablets (150 mg) by mouth 2 times daily    Hyperlipidemia with target LDL less than 130        MG Caps capsule   Generic drug:  acetylcysteine      Take 500 mg by mouth daily        PROBIOTIC ADVANCED Caps      Take by mouth daily        vitamin B complex with vitamin C Tabs tablet      Take 1 tablet by mouth daily

## 2017-12-14 NOTE — PROGRESS NOTES
[place] [facility]      El Newell Return to see me today for follow-up of his right CEA performed for high-grade stenosis On 7/26/2016.  We also found extensive plaque within the common carotid artery requiring an extended endarterectomy and vein patch.  He is noted to have very mild disease with the left carotid artery but calcification of the wall.  He is remained asymptomatic.      He had severe renal vascular hypertension.  A left renal stent was placed on 8/12/2016.  This significantly improved his hypertension.      PMH: Medications:  Aspirin, Lipitor, Zetia, Avapro 150 mg b.i.d.            Nonsmoker.            No claudication or cardiac symptoms             Marked improvement of renal vascular hypertension  Following stenting        Renal functions remained stable with a most recent serum creatinine= 1.16.  LDL= 71 on his medications.    Recent cardiac echo revealed ejection fraction of 60-65%      Exam: Alert and appropriate.  Blood pressure 154/90 ( usual less than 120 at home)  Pulse 65 regular   Normal affect.  Well-healed right carotid incision with no bruit   Chest= clear    Cardiovascular=RR with grade 3/6 murmur (New per patient)  Extremities= no edema, normal sensation.              +3 dorsalis pedis pulses bilaterally      Carotid duplex reveals a widely patent right CEA including the common carotid artery more proximally.  Left carotid including the bulb and proximal ICA has a calcified wall but very minimal narrowing essentially normal velocity of 123 cm/s          Renal ultrasound reveals a widely East patent stent and normal parenchymal size of the left kidney.  An incidental small infrarenal AAA is noted measuring 3.3 by 3.3 cm with a length of 4.3 cm.          Impression:  #1  Widely patent right CEA with mild diffuse calcification of left carotid system with no significant narrowing.  Repeat exam in a year and a half.                         #2  Renal vascular  hypertension with excellent response to left renal artery stenting.  Stent remains widely patent.  Normal renal function.  Hypertensive today but most likely due to coming to the clinic during a busy day.  Less concerning with minimal antihypertensive medications and the fact that his blood pressure is normal at home.                          #3   Incidental for renal AAA.  This is very small and of no clinical concern.  We will reevaluate this in a year and one half when following up of his renal stent.                         #4   Good control of risk factors.  LDL goal and nonsmoker.  Noted heart murmur  But Dr. Garcia has been following with normal echo.      Vaibhav Yoder MD  Please route or send letter to:  Primary Care Provider (PCP) and Dr ANTONIA Garcia cardiology

## 2017-12-14 NOTE — LETTER
[facility] [place] [address]  [place] [address]  Phone: [phone]  Fax: [phone]      2017    Re: El Newell - 1960     El Newell Return to see me today for follow-up of his right CEA performed for high-grade stenosis On 2016.  We also found extensive plaque within the common carotid artery requiring an extended endarterectomy and vein patch.  He is noted to have very mild disease with the left carotid artery but calcification of the wall.  He is remained asymptomatic.     He had severe renal vascular hypertension.  A left renal stent was placed on 2016.  This significantly improved his hypertension.      PMH: Medications:  Aspirin, Lipitor, Zetia, Avapro 150 mg b.i.d.            Nonsmoker.            No claudication or cardiac symptoms            Marked improvement of renal vascular hypertension  Following stenting      Renal functions remained stable with a most recent serum creatinine= 1.16.  LDL= 71 on his medications.     Recent cardiac echo revealed ejection fraction of 60-65%     Exam: Alert and appropriate.  Blood pressure 154/90 ( usual less than 120 at home)  Pulse 65 regular   Normal affect.  Well-healed right carotid incision with no bruit  Chest= clear    Cardiovascular=RR with grade 3/6 murmur (New per patient)  Extremities= no edema, normal sensation.  +3 dorsalis pedis pulses bilaterally      Carotid duplex reveals a widely patent right CEA including the common carotid artery more proximally.  Left carotid including the bulb and proximal ICA has a calcified wall but very minimal narrowing essentially normal velocity of 123 cm/s      Renal ultrasound reveals a widely East patent stent and normal parenchymal size of the left kidney.  An incidental small infrarenal AAA is noted measuring 3.3 by 3.3 cm with a length of 4.3 cm.      Impression:  #1  Widely patent right CEA with mild diffuse calcification of left  carotid system with no significant narrowing.  Repeat exam in a year and a half.                          #2  Renal vascular hypertension with excellent response to left renal artery stenting.  Stent remains widely patent.  Normal renal function.  Hypertensive today but most likely due to coming to the clinic during a busy day.  Less concerning with minimal antihypertensive medications and the fact that his blood pressure is normal at home.                           #3   Incidental for renal AAA.  This is very small and of no clinical concern.  We will reevaluate this in a year and one half when following up of his renal stent.                          #4   Good control of risk factors.  LDL goal and nonsmoker.  Noted heart murmur  But Dr. Garcia has been following with normal echo.        Vaibhav Yoder MD

## 2017-12-14 NOTE — NURSING NOTE
"Chief Complaint   Patient presents with     RECHECK      1 year F/U. History of Right carotid endarterectomy & renal stent.Ultrasounds on 12/12/17.       Initial /90 (BP Location: Right arm, Patient Position: Chair, Cuff Size: Adult Large)  Pulse 65 Estimated body mass index is 26.28 kg/(m^2) as calculated from the following:    Height as of 9/27/17: 5' 6\" (1.676 m).    Weight as of 9/27/17: 162 lb 12.8 oz (73.8 kg).  Medication Reconciliation: complete    Face to Face time 8 minutes  Gabriela White CMA      "

## 2018-01-04 DIAGNOSIS — E78.5 HYPERLIPIDEMIA LDL GOAL <70: ICD-10-CM

## 2018-01-04 RX ORDER — ATORVASTATIN CALCIUM 40 MG/1
40 TABLET, FILM COATED ORAL
Qty: 90 TABLET | Refills: 2 | Status: SHIPPED | OUTPATIENT
Start: 2018-01-04 | End: 2018-09-19 | Stop reason: ALTCHOICE

## 2018-01-14 ENCOUNTER — MYC MEDICAL ADVICE (OUTPATIENT)
Dept: INTERNAL MEDICINE | Facility: CLINIC | Age: 58
End: 2018-01-14

## 2018-01-26 ENCOUNTER — OFFICE VISIT (OUTPATIENT)
Dept: INTERNAL MEDICINE | Facility: CLINIC | Age: 58
End: 2018-01-26
Payer: COMMERCIAL

## 2018-01-26 VITALS
OXYGEN SATURATION: 97 % | BODY MASS INDEX: 25.92 KG/M2 | DIASTOLIC BLOOD PRESSURE: 78 MMHG | TEMPERATURE: 97.9 F | HEART RATE: 60 BPM | SYSTOLIC BLOOD PRESSURE: 134 MMHG | WEIGHT: 160.6 LBS

## 2018-01-26 DIAGNOSIS — I70.1 RENAL ARTERY STENOSIS (H): ICD-10-CM

## 2018-01-26 DIAGNOSIS — I1A.0 RESISTANT HYPERTENSION: ICD-10-CM

## 2018-01-26 DIAGNOSIS — J32.0 CHRONIC MAXILLARY SINUSITIS: ICD-10-CM

## 2018-01-26 DIAGNOSIS — M54.16 LUMBAR RADICULOPATHY: Primary | ICD-10-CM

## 2018-01-26 PROCEDURE — 99214 OFFICE O/P EST MOD 30 MIN: CPT | Performed by: INTERNAL MEDICINE

## 2018-01-26 RX ORDER — HYDROCODONE BITARTRATE AND ACETAMINOPHEN 5; 325 MG/1; MG/1
.5-1 TABLET ORAL EVERY 6 HOURS PRN
Qty: 15 TABLET | Refills: 0 | Status: SHIPPED | OUTPATIENT
Start: 2018-01-26 | End: 2018-12-07

## 2018-01-26 RX ORDER — DOXYCYCLINE 100 MG/1
100 CAPSULE ORAL 2 TIMES DAILY
Qty: 56 CAPSULE | Refills: 0 | Status: SHIPPED | OUTPATIENT
Start: 2018-01-26 | End: 2018-02-23

## 2018-01-26 RX ORDER — GABAPENTIN 300 MG/1
CAPSULE ORAL
Qty: 60 CAPSULE | Refills: 0 | Status: SHIPPED | OUTPATIENT
Start: 2018-01-26 | End: 2018-12-07

## 2018-01-26 NOTE — MR AVS SNAPSHOT
After Visit Summary   1/26/2018    El Newell    MRN: [id number]           Patient Information     Date Of Birth          1960        Visit Information        Provider Department      1/26/2018 3:40 PM Lee Dave MD [facility]        Today's Diagnoses     Lumbar radiculopathy    -  1    Chronic maxillary sinusitis          Care Instructions    *  The current back pain spreading into the legs may be a possible disc herniation or spinal stenosis affecting one of the lumbar nerve roots.     *  MRI to evaluate the lower thoracic/lumbar spine.   I will contact the radiology department to contact you to arrange this Xray procedure. (if you have claustrophobia, we can try and order an open sided MRI).       --If you have not heard from me within 1-2 business days after the MRI scan, please contact me ([phone]) to review the results and get recommendations.      --If MRI reveals a specific mechanical cause for your pain (i.e. Herniated disc, spinal stenosis), we will direct you to the proper specialist for further evaluation and treatment.       --If no specific cause for the low back pain is seen on the MRI, then we will treat with pain meds, physical therapy.     *  Gabapentin to help treat the nerve pain.  Take one 300 mg tablet per day in the evening for 2 days, then if no better, then take 1 tablet twice per day.  If still no better after 6-7 days, then take 1 tablet three times per day.   Beware of drowsiness with this medication.     *  Hydrocodone (narcotic pain medication) 1/2 or 1 tablet three to four times per day as needed for severe pain only.   Beware of drowsiness, nausea, vomiting, when taking this medication.  Do not drive, or operate dangerous equipment after taking this.     *  Stop any Aspirin (even the low dose aspirin) until further notice in case we need to send for procedures that involve injections.               SINUSITIS (SINUS  "INFECTION):       *  An antibiotic is possibly indicated at this time.       --Doxycycline 100 mg twice per day for 28 days.      Please remember that antibiotics only kill bacteria, they do not make you feel better in any other way.  The antibiotic is only a part of what you need to do to feel better.      *  Be sure to drink lots of fluids.  If the appetite and intake of food is way down, then at leat try to eat soup.  Dehydration is the thing that causes people to end up in the hospital with viral infections and the flu.     *  Try lozenges (Cepostat, Cepacol, hard candies, Zinc lozenges, etc)    *  Mucinex extended release twice per day on a regular basis for the next few days, then twice per day as needed.  OK to take the regular Mucinex, you may just have to take it 2-3 times per day.      *  Saline nasal spray as often as needed.     Examples of over the counter saline nasal sprays:            *  Relieve congestion/pressure by rinsing out the sinus cavities with the Sinus Rinse Kit or Netti Pot.  These are available at most drug stores.  Be sure to use distilled water with either.                *  Decongestants as needed.  Be sure to take the lowest dose needed.  Take decongestants from only ONE source.  It is possible to inadvertantly take more than the recommended amounts if you take decongestants from multiple products (i.e. Do NOT take Mucinex-D and Claritin-D together)    * Beware of decongestants or medications preparations that have a \"D\", these contain pseudoephedrine or phenylephrine which may raise your blood pressure. If your blood pressure is well controlled and you are not on multiple medications, then you may be able to take small amounts of decongestant without a large chance of side effects, but please monitor your blood pressure and if >140/90 while on the decongestants, then stop those products.       *  If you cannot tolerate decongestants or have been told not to take decongestants, you " can use chlortrimeton (chlorpheniramine) if you react poorly to decongestants.  Always try and use the lowest dose needed.  If you have a low of overnight or early morning allergy symptoms, then try taking you favorite nighttime multi symptom cold reliever medication (such as Nyquil, Vicks Formula 44, etc.)    *  Affrin nasal spray as needed for severe nasal congestion (especially before the airplane trip), but limit the use to no more than 5-7 days out of fear of producing chronic nasal drainage.      *  Tylenol as needed for any headaches of low grade temperatures.     *  Ibuprofen as needed for body aches, fevers, headaches    *  Call the clinic if any changes in the symptoms such as worsening fevers, or the amount and quality of the sputum changes, or if you have any major difficulties breathing, or the symptoms fail to clear for a few weeks.    *  Most upper respiratory infection will clear 1-2 weeks, but it is not uncommon for post viral coughs to last for several weeks, even rarely the entire winter.        *  Use steroid nasal spray (available over the counter)   --typical brands include Flonase (fluticasone) or Nasonex (mometasone) or Nasocort (triamcinolone)    Examples of steroid nasal spray:              --Use 2 sprays into each nostril once per day, every day regardless of how you feel for the next 4-8 weeks, depending on the length of the allergy season.  This type of steroid nasal spray will take at least 10 days to reach full effect, please use it for at least 3 full weeks before deciding if it doesn't work for you.             Follow-ups after your visit        Who to contact     If you have questions or need follow up information about today's clinic visit or your schedule please contact [facility] directly at [phone].  Normal or non-critical lab and imaging results will be communicated to you by MyChart, letter or phone within 4 business days after the clinic has  received the results. If you do not hear from us within 7 days, please contact the clinic through [employer] or phone. If you have a critical or abnormal lab result, we will notify you by phone as soon as possible.  Submit refill requests through [employer] or call your pharmacy and they will forward the refill request to us. Please allow 3 business days for your refill to be completed.          Additional Information About Your Visit        [employer] Information     [employer] gives you secure access to your electronic health record. If you see a primary care provider, you can also send messages to your care team and make appointments. If you have questions, please call your primary care clinic.  If you do not have a primary care provider, please call [phone] and they will assist you.        Care EveryWhere ID     This is your Care EveryWhere ID. This could be used by other organizations to access your [place] medical records  [id number]        Your Vitals Were     Pulse Temperature Pulse Oximetry BMI (Body Mass Index)          60 97.9  F (36.6  C) (Oral) 97% 25.92 kg/m2         Blood Pressure from Last 3 Encounters:   01/26/18 134/78   12/14/17 154/90   09/27/17 131/81    Weight from Last 3 Encounters:   01/26/18 160 lb 9.6 oz (72.8 kg)   09/27/17 162 lb 12.8 oz (73.8 kg)   02/27/17 163 lb (73.9 kg)              Today, you had the following     No orders found for display         Today's Medication Changes          These changes are accurate as of 1/26/18  4:51 PM.  If you have any questions, ask your nurse or doctor.               Start taking these medicines.        Dose/Directions    doxycycline monohydrate 100 MG capsule   Used for:  Chronic maxillary sinusitis   Started by:  Lee Dave MD        Dose:  100 mg   Take 1 capsule (100 mg) by mouth 2 times daily for 28 days   Quantity:  56 capsule   Refills:  0       gabapentin 300 MG capsule   Commonly known as:  NEURONTIN   Used for:  Lumbar  radiculopathy   Started by:  Lee Dave MD        1 tablet at bedtime for 2 days, then 1 tablet twice per day.   Quantity:  60 capsule   Refills:  0       HYDROcodone-acetaminophen 5-325 MG per tablet   Commonly known as:  NORCO   Used for:  Lumbar radiculopathy   Started by:  Lee Dave MD        Dose:  0.5-1 tablet   Take 0.5-1 tablets by mouth every 6 hours as needed for moderate to severe pain maximum 4 tablet(s) per day   Quantity:  15 tablet   Refills:  0            Where to get your medicines      These medications were sent to [id number]  [place], [place] - [address]  [address] [place] [address]     Phone:  [phone]     doxycycline monohydrate 100 MG capsule    gabapentin 300 MG capsule         Some of these will need a paper prescription and others can be bought over the counter.  Ask your nurse if you have questions.     Bring a paper prescription for each of these medications     HYDROcodone-acetaminophen 5-325 MG per tablet                Primary Care Provider Office Phone # Fax #    Lee Dave [phone]       [address] [place] [address]        Equal Access to Services     [facility]: Hadii monica martin hadasho Soomaali, waaxda luqadaha, qaybta kaalmada adeegyada, mariza carrizales. So [facility] [phone].    ATENCIÓN: Si habla español, tiene a german disposición servicios gratuitos de asistencia lingüística. Llame al [phone].    We comply with applicable federal civil rights laws and [place] laws. We do not discriminate on the basis of race, color, national origin, age, disability, sex, sexual orientation, or gender identity.            Thank you!     Thank you for [place] [facility] [place] [place] your [place]. Our goal is always to provide you with excellent care. Hearing back from our patients is one way we can continue to improve our  services. Please take a few minutes to complete the written survey that you may receive in the mail after your visit with us. Thank you!             Your Updated Medication List - Protect others around you: Learn how to safely use, store and throw away your medicines at www.disposemymeds.org.          This list is accurate as of 1/26/18  4:51 PM.  Always use your most recent med list.                   Brand Name Dispense Instructions for use Diagnosis    aspirin 81 MG tablet      Take 81 mg by mouth daily    Essential hypertension, benign       atorvastatin 40 MG tablet    LIPITOR    90 tablet    Take 1 tablet (40 mg) by mouth daily (with dinner)    Hyperlipidemia LDL goal <70       Co Q 10 100 MG Caps      Take by mouth daily        doxycycline monohydrate 100 MG capsule     56 capsule    Take 1 capsule (100 mg) by mouth 2 times daily for 28 days    Chronic maxillary sinusitis       ezetimibe 10 MG tablet    ZETIA    90 tablet    Take 1 tablet (10 mg) by mouth At Bedtime    Hyperlipidemia LDL goal <70       fish oil-omega-3 fatty acids 1000 MG capsule      Take 1 g by mouth daily        gabapentin 300 MG capsule    NEURONTIN    60 capsule    1 tablet at bedtime for 2 days, then 1 tablet twice per day.    Lumbar radiculopathy       HYDROcodone-acetaminophen 5-325 MG per tablet    NORCO    15 tablet    Take 0.5-1 tablets by mouth every 6 hours as needed for moderate to severe pain maximum 4 tablet(s) per day    Lumbar radiculopathy       irbesartan 300 MG tablet    AVAPRO    90 tablet    Take 0.5 tablets (150 mg) by mouth 2 times daily    Hyperlipidemia with target LDL less than 130        MG Caps capsule   Generic drug:  acetylcysteine      Take 500 mg by mouth daily        PROBIOTIC ADVANCED Caps      Take by mouth daily        vitamin B complex with vitamin C Tabs tablet      Take 1 tablet by mouth daily

## 2018-01-26 NOTE — PATIENT INSTRUCTIONS
*  The current back pain spreading into the legs may be a possible disc herniation or spinal stenosis affecting one of the lumbar nerve roots.     *  MRI to evaluate the lower thoracic/lumbar spine.   I will contact the radiology department to contact you to arrange this Xray procedure. (if you have claustrophobia, we can try and order an open sided MRI).       --If you have not heard from me within 1-2 business days after the MRI scan, please contact me ([phone]) to review the results and get recommendations.      --If MRI reveals a specific mechanical cause for your pain (i.e. Herniated disc, spinal stenosis), we will direct you to the proper specialist for further evaluation and treatment.       --If no specific cause for the low back pain is seen on the MRI, then we will treat with pain meds, physical therapy.     *  Gabapentin to help treat the nerve pain.  Take one 300 mg tablet per day in the evening for 2 days, then if no better, then take 1 tablet twice per day.  If still no better after 6-7 days, then take 1 tablet three times per day.   Beware of drowsiness with this medication.     *  Hydrocodone (narcotic pain medication) 1/2 or 1 tablet three to four times per day as needed for severe pain only.   Beware of drowsiness, nausea, vomiting, when taking this medication.  Do not drive, or operate dangerous equipment after taking this.     *  Stop any Aspirin (even the low dose aspirin) until further notice in case we need to send for procedures that involve injections.               SINUSITIS (SINUS INFECTION):       *  An antibiotic is possibly indicated at this time.       --Doxycycline 100 mg twice per day for 28 days.      Please remember that antibiotics only kill bacteria, they do not make you feel better in any other way.  The antibiotic is only a part of what you need to do to feel better.      *  Be sure to drink lots of fluids.  If the appetite and intake of food is way down, then at leat try  "to eat soup.  Dehydration is the thing that causes people to end up in the hospital with viral infections and the flu.     *  Try lozenges (Cepostat, Cepacol, hard candies, Zinc lozenges, etc)    *  Mucinex extended release twice per day on a regular basis for the next few days, then twice per day as needed.  OK to take the regular Mucinex, you may just have to take it 2-3 times per day.      *  Saline nasal spray as often as needed.     Examples of over the counter saline nasal sprays:            *  Relieve congestion/pressure by rinsing out the sinus cavities with the Sinus Rinse Kit or Netti Pot.  These are available at most drug stores.  Be sure to use distilled water with either.                *  Decongestants as needed.  Be sure to take the lowest dose needed.  Take decongestants from only ONE source.  It is possible to inadvertantly take more than the recommended amounts if you take decongestants from multiple products (i.e. Do NOT take Mucinex-D and Claritin-D together)    * Beware of decongestants or medications preparations that have a \"D\", these contain pseudoephedrine or phenylephrine which may raise your blood pressure. If your blood pressure is well controlled and you are not on multiple medications, then you may be able to take small amounts of decongestant without a large chance of side effects, but please monitor your blood pressure and if >140/90 while on the decongestants, then stop those products.       *  If you cannot tolerate decongestants or have been told not to take decongestants, you can use chlortrimeton (chlorpheniramine) if you react poorly to decongestants.  Always try and use the lowest dose needed.  If you have a low of overnight or early morning allergy symptoms, then try taking you favorite nighttime multi symptom cold reliever medication (such as Nyquil, Vicks Formula 44, etc.)    *  Affrin nasal spray as needed for severe nasal congestion (especially before the airplane trip), " but limit the use to no more than 5-7 days out of fear of producing chronic nasal drainage.      *  Tylenol as needed for any headaches of low grade temperatures.     *  Ibuprofen as needed for body aches, fevers, headaches    *  Call the clinic if any changes in the symptoms such as worsening fevers, or the amount and quality of the sputum changes, or if you have any major difficulties breathing, or the symptoms fail to clear for a few weeks.    *  Most upper respiratory infection will clear 1-2 weeks, but it is not uncommon for post viral coughs to last for several weeks, even rarely the entire winter.        *  Use steroid nasal spray (available over the counter)   --typical brands include Flonase (fluticasone) or Nasonex (mometasone) or Nasocort (triamcinolone)    Examples of steroid nasal spray:              --Use 2 sprays into each nostril once per day, every day regardless of how you feel for the next 4-8 weeks, depending on the length of the allergy season.  This type of steroid nasal spray will take at least 10 days to reach full effect, please use it for at least 3 full weeks before deciding if it doesn't work for you.

## 2018-01-26 NOTE — PROGRESS NOTES
"  SUBJECTIVE:   El Newell is a 57 year old male who presents to clinic today for the following health issues:      Back Pain       Duration: 2 weeks        Specific cause: turning/bending    Description:   Location of pain: low back bilateral and middle of back bilateral  Character of pain: stabbing and gnawing  Pain radiation:none and aroud waist  New numbness or weakness in legs, not attributed to pain:  YES- feet and calf area    Was lifting weight with barbells.   When bending over to place barbells on rck, he felt a \"POP\" in the lower thoracic/upper lumbar area and has had constant pain since that time.   Feeling some tingling in the bottoms and sides fo both feet, but no weakness.   No  or GI complaints.     Has seen Chripractor for this this week, but no help  Had plain films done, but those showed nothing.     Pain not that bad when walking, but mostly worse with sitting, laying, sleeping.   Having hard time sleeping due to pain.     FYI: Will be flying to [place] next thursday for conference.       Intensity: Currently 7/10    History:   Pain interferes with job: No  History of back problems: previous spinal surgery - 5 years  Any previous MRI or X-rays:   Sees a specialist for back pain:    Therapies tried without relief:     Alleviating factors:   Improved by: none      Precipitating factors:  Worsened by: Lying Flat and Walking    Functional and Psychosocial Screen (Cam[facility] STarT Back):        Had spinal fusions 2011 of L5/S1 in [facility] by neurosurgeon.   Had radicular pain with foot drop at that time.         Accompanying Signs & Symptoms:  Risk of Fracture:    Risk of Cauda Equina:    Risk of Infection:    Risk of Cancer:    Risk of Ankylosing Spondylitis:  Onset at age <35, male, AND morning back stiffness.                   RESPIRATORY SYMPTOMS      Duration: 3 months    Description  nasal congestion, rhinorrhea, facial pain/pressure and watery eyes    Severity: moderate    Accompanying signs " and symptoms: None    History (predisposing factors):  Sinus infestions    Precipitating or alleviating factors: None    Therapies tried and outcome:  none    Took course of Z pack with immediate improvement of sx for the next week, then sx returned.   Took 10 days of augmentin with immediate improvement for the 2 weeks after, then sx returned.   Sx mainly pressure, some moderate puruelent drainage.   No fever,s no shortness of breath, no dyspnea on exertion.     Problem list and histories reviewed & adjusted, as indicated.  Additional history: as documented        Reviewed and updated as needed this visit by clinical staff  Tobacco  Allergies  Meds  Med Hx  Surg Hx  Fam Hx  Soc Hx      Reviewed and updated as needed this visit by Provider           Past Medical History:  ---------------------------  Past Medical History:   Diagnosis Date     Carotid stenosis, right     s/p right CEA 7/27/16     Coronary artery disease 6-6-16    per Ca+ score = TOTAL CALCIUM SCORE: 1145.9     Gastro-oesophageal reflux disease      Hyperlipidemia LDL goal < 130 8/13/14         Hypertension 8/8/14    hospitalized with hypertensive urgency 8/13/14     Lung nodule 6-6-16    Per Ca+ score - soft tissue results. indeterminate 8 mm LUNG NODULE right lung     Near syncope      Renal artery stenosis (H)     s/p PTA & BMS to left renal artery       Past Surgical History:  ---------------------------  Past Surgical History:   Procedure Laterality Date     APPENDECTOMY       ENDARTERECTOMY CAROTID Right 7/26/2016    Procedure: ENDARTERECTOMY CAROTID;  Surgeon: Vaibhav Yoder MD;  Location: [facility] OR     FUSION SPINE POSTERIOR TWO LEVELS  2011    L5-S1 spine fusion, with instrumentation       Current Medications:  ---------------------------  Current Outpatient Prescriptions   Medication Sig Dispense Refill     atorvastatin (LIPITOR) 40 MG tablet Take 1 tablet (40 mg) by mouth daily (with dinner) 90 tablet 2     ezetimibe  "(ZETIA) 10 MG tablet Take 1 tablet (10 mg) by mouth At Bedtime 90 tablet 3     Coenzyme Q10 (CO Q 10) 100 MG CAPS Take by mouth daily       acetylcysteine (NAC) 500 MG CAPS capsule Take 500 mg by mouth daily       fish oil-omega-3 fatty acids 1000 MG capsule Take 1 g by mouth daily       Probiotic Product (PROBIOTIC ADVANCED) CAPS Take by mouth daily       irbesartan (AVAPRO) 300 MG tablet Take 0.5 tablets (150 mg) by mouth 2 times daily 90 tablet 3     vitamin  B complex with vitamin C (VITAMIN  B COMPLEX) TABS Take 1 tablet by mouth daily       aspirin 81 MG tablet Take 81 mg by mouth daily          Allergies:  -------------  Allergies   Allergen Reactions     Ace Inhibitors Cough     Carvedilol      Palpitations, syncope     Clonidine Other (See Comments)     Mental fatigue, somnolence     Labetalol Other (See Comments)     diffuse weakness, muscle cramps, stocking/glove fasiculations, urinary hesitancy, muscle cramps     Metoprolol Other (See Comments)     Severe fatigue, \"made heart jump\"     Amlodipine Other (See Comments)     Palpitations, dizziness       Social History:  -------------------  Social History     Social History     Marital status:      Spouse name: N/A     Number of children: N/A     Years of education: N/A     Occupational History     psychiatrist Self Employed.     Social History Main Topics     Smoking status: Former Smoker     Packs/day: 1.00     Years: 27.00     Quit date: 8/18/2004     Smokeless tobacco: Never Used     Alcohol use 0.0 oz/week     0 Standard drinks or equivalent per week      Comment: 1-3 drink every night last last night,scotch     Drug use: No     Sexual activity: No     Other Topics Concern     Caffeine Concern Yes     2 cups of coffee a day     Sleep Concern No     Stress Concern No     Weight Concern No     Special Diet Yes     vegetarian diet     Exercise Yes     biking,  walking x5     Bike Helmet No     Seat Belt Yes     Social History Narrative       Family " Medical History:  ------------------------------  Family History   Problem Relation Age of Onset     Hypertension Mother      C.A.D. Mother 55     MI and stent age 55     Coronary Artery Disease Mother      Myocardial Infarction Mother      Heart Surgery Mother      stents     Hypertension Father      C.A.D. Father      MI age 55, stents age 55     Coronary Artery Disease Father      CANCER Father      Myocardial Infarction Father      Heart Surgery Father      stents     Neurologic Disorder Sister 30     progressive supranuclear palsy     Hypertension Sister      Hypertension Sister      Heart Surgery Sister      stent     Hypertension Brother      Coronary Artery Disease Maternal Grandmother      Hypertension Maternal Grandmother      Heart Surgery Maternal Grandmother       during surgery     CANCER Maternal Grandfather      Leukemia Maternal Grandfather      Hypertension Paternal Grandmother      Other - See Comments Paternal Grandmother      embolism     HEART DISEASE Paternal Grandfather          ROS:  REVIEW OF SYSTEMS:    RESP: negative for cough, dyspnea, wheezing, hemoptysis  CV: negative for chest pain, palpitations, PND, [place], orthopnea; reports no changes in their ability to perform physical activity (from cardiovascular standpoint)  GI: negative for dysphagia, N/V, pain, melena, diarrhea and constipation  NEURO: negative for paralysis, incoordination, or focal weakness ; POS for  Numbness/tingling in the bottoms and outside of both feet, no focla weakness.     OBJECTIVE:                                                    /78  Pulse 60  Temp 97.9  F (36.6  C) (Oral)  Wt 160 lb 9.6 oz (72.8 kg)  SpO2 97%  BMI 25.92 kg/m2     GENERAL alert and no distress  EYES:  Normal sclera,conjunctiva, EOMI  HENT: oral and posterior pharynx without lesions or erythema, facies symmetric  NECK: Neck supple. No LAD, without thyroidmegaly or JVD., Carotids without bruits.  RESP: Clear to ausculation  bilaterally without wheezes or crackles. Normal BS in all fields.  CV: RRR normal S1S2 without murmurs, rubs or gallops. PMI normal  LYMPH: no cervical lymph adenopathy appreciated  MS: extremities- no gross deformities of the visible extremities noted, no edema  PSYCH: Alert and oriented times 3; speech- coherent  SKIN:  No obvious significant skin lesions on visible portions of face   BACK:  Pt appears uncomfortable, but not in severe distress.  Pain to palpation of paraspinal lumbar muscles, muscles in spasm, palpation reproduces pain exactly. straight leg-raises negative bilaterally.  Able to move on and off the exam table, no obsevred weakness in the feet or legs with walking, standing, or ambulation. Normal reflexes in the achilles and patellar tendons.   Normal strength, able to push off on both feet and toes.          ASSESSMENT/PLAN:                                                      (M54.16) Lumbar radiculopathy  (primary encounter diagnosis)  Comment: The patient has evidence for lumbar radicular pain.   They need to get an MRI of the spine to further see what is going on and therefore what can be done about it.   If the MRI shows an obvious mechanical problem (i.e. Significant spinal stenosis, herniated disc, etc), then physical therapy will not do much and we will refer to spine surgeon.    If the MRI does not show a specific problem causing the pain, then he will need an aggressive physical therapy regimen at [facility], or [facility],    If he does not have claustrophobia, then get it at [facility].  If he has claustrophobia, then we can order an open sided MRI at either [facility] or [facility].   Told to temporarily stop and NSAIDs or ASA in case an epidural steroid injection is required.  Told to go to the ER immediately for any sudden extreme focal lower extremity weakness or bowel/bladder incontinence.     Offered course of  steroids to treat any acute inflammation around a nerve root/disc, but he declined reporting prior severe anxiety and agitation with prior Medrol Dose pack.   WIll give a few pain tablets ( norco) to use for the most severe pain.  He was not asking for them, but is taking a trip on an airplane to [place] in 6 days.   Plan: gabapentin (NEURONTIN) 300 MG capsule,         HYDROcodone-acetaminophen (NORCO) 5-325 MG per         tablet            (J32.0) Chronic maxillary sinusitis  Comment: completed 2 courses of antibiotic with resolution of sx for the weeks he was on them.   Will give another to cover atypical, for longer.   Refer to ENT if no better.   Add steroid nasal spray  Plan: doxycycline monohydrate 100 MG capsule            (I70.1) Renal artery stenosis (H)  Comment: This condition is currently controlled on the current medical regimen.  Continue current therapy.   Plan:     (I10) Resistant hypertension  Comment: This condition is currently controlled on the current medical regimen.  Continue current therapy.   Doing well since renal artery stenosis stent.   Plan:        See Patient Instructions    CARMELINA KOEHLER M.D., MD  [facility]

## 2018-01-29 ENCOUNTER — MYC MEDICAL ADVICE (OUTPATIENT)
Dept: INTERNAL MEDICINE | Facility: CLINIC | Age: 58
End: 2018-01-29

## 2018-01-30 ENCOUNTER — TRANSFERRED RECORDS (OUTPATIENT)
Dept: HEALTH INFORMATION MANAGEMENT | Facility: CLINIC | Age: 58
End: 2018-01-30

## 2018-01-31 ENCOUNTER — MYC MEDICAL ADVICE (OUTPATIENT)
Dept: INTERNAL MEDICINE | Facility: CLINIC | Age: 58
End: 2018-01-31

## 2018-01-31 NOTE — TELEPHONE ENCOUNTER
See MyChart message   I sent copy of report to him at his email and sent copy to be scanned into chart.

## 2018-02-27 ENCOUNTER — TRANSFERRED RECORDS (OUTPATIENT)
Dept: HEALTH INFORMATION MANAGEMENT | Facility: CLINIC | Age: 58
End: 2018-02-27

## 2018-03-07 ENCOUNTER — TRANSFERRED RECORDS (OUTPATIENT)
Dept: HEALTH INFORMATION MANAGEMENT | Facility: CLINIC | Age: 58
End: 2018-03-07

## 2018-06-14 ENCOUNTER — PRE VISIT (OUTPATIENT)
Dept: CARDIOLOGY | Facility: CLINIC | Age: 58
End: 2018-06-14

## 2018-07-11 ENCOUNTER — TELEPHONE (OUTPATIENT)
Dept: CARDIOLOGY | Facility: CLINIC | Age: 58
End: 2018-07-11

## 2018-07-11 NOTE — TELEPHONE ENCOUNTER
Per scheduling, Please extend stress test and labs ordered by Dr. Garcia.  Patient has f/u OV with Dr. Thompson 9-19-18.   Orders extended.

## 2018-09-11 DIAGNOSIS — I25.10 CORONARY ARTERY DISEASE INVOLVING NATIVE CORONARY ARTERY OF NATIVE HEART WITHOUT ANGINA PECTORIS: ICD-10-CM

## 2018-09-11 DIAGNOSIS — E78.5 HYPERLIPIDEMIA WITH TARGET LDL LESS THAN 130: ICD-10-CM

## 2018-09-11 DIAGNOSIS — I10 ESSENTIAL HYPERTENSION, BENIGN: ICD-10-CM

## 2018-09-11 DIAGNOSIS — E78.5 HYPERLIPIDEMIA LDL GOAL <70: ICD-10-CM

## 2018-09-11 LAB
ALT SERPL W P-5'-P-CCNC: 6 U/L (ref 5–30)
ANION GAP SERPL CALCULATED.3IONS-SCNC: 12.3 MMOL/L (ref 6–17)
BUN SERPL-MCNC: 19 MG/DL (ref 7–30)
CALCIUM SERPL-MCNC: 10.5 MG/DL (ref 8.5–10.5)
CHLORIDE SERPL-SCNC: 103 MMOL/L (ref 98–107)
CHOLEST SERPL-MCNC: 171 MG/DL
CO2 SERPL-SCNC: 29 MMOL/L (ref 23–29)
CREAT SERPL-MCNC: 1.03 MG/DL (ref 0.7–1.3)
GFR SERPL CREATININE-BSD FRML MDRD: 74 ML/MIN/1.7M2
GLUCOSE SERPL-MCNC: 96 MG/DL (ref 70–105)
HDLC SERPL-MCNC: 68 MG/DL
LDLC SERPL CALC-MCNC: 76 MG/DL
NONHDLC SERPL-MCNC: 103 MG/DL
POTASSIUM SERPL-SCNC: 4.3 MMOL/L (ref 3.5–5.1)
SODIUM SERPL-SCNC: 140 MMOL/L (ref 136–145)
TRIGL SERPL-MCNC: 137 MG/DL

## 2018-09-11 PROCEDURE — 36415 COLL VENOUS BLD VENIPUNCTURE: CPT | Performed by: INTERNAL MEDICINE

## 2018-09-11 PROCEDURE — 84460 ALANINE AMINO (ALT) (SGPT): CPT | Performed by: INTERNAL MEDICINE

## 2018-09-11 PROCEDURE — 80048 BASIC METABOLIC PNL TOTAL CA: CPT | Performed by: INTERNAL MEDICINE

## 2018-09-11 PROCEDURE — 80061 LIPID PANEL: CPT | Performed by: INTERNAL MEDICINE

## 2018-09-19 ENCOUNTER — OFFICE VISIT (OUTPATIENT)
Dept: CARDIOLOGY | Facility: CLINIC | Age: 58
End: 2018-09-19
Payer: COMMERCIAL

## 2018-09-19 ENCOUNTER — TELEPHONE (OUTPATIENT)
Dept: CARDIOLOGY | Facility: CLINIC | Age: 58
End: 2018-09-19

## 2018-09-19 VITALS
DIASTOLIC BLOOD PRESSURE: 82 MMHG | BODY MASS INDEX: 25.2 KG/M2 | HEART RATE: 78 BPM | WEIGHT: 156.8 LBS | HEIGHT: 66 IN | SYSTOLIC BLOOD PRESSURE: 131 MMHG

## 2018-09-19 DIAGNOSIS — E78.2 MIXED HYPERLIPIDEMIA: ICD-10-CM

## 2018-09-19 DIAGNOSIS — I1A.0 RESISTANT HYPERTENSION: ICD-10-CM

## 2018-09-19 DIAGNOSIS — I25.10 CORONARY ARTERY DISEASE INVOLVING NATIVE CORONARY ARTERY OF NATIVE HEART WITHOUT ANGINA PECTORIS: Primary | ICD-10-CM

## 2018-09-19 DIAGNOSIS — E78.5 HYPERLIPIDEMIA WITH TARGET LDL LESS THAN 130: ICD-10-CM

## 2018-09-19 DIAGNOSIS — I70.1 RENAL ARTERY STENOSIS (H): ICD-10-CM

## 2018-09-19 PROCEDURE — 99215 OFFICE O/P EST HI 40 MIN: CPT | Performed by: INTERNAL MEDICINE

## 2018-09-19 PROCEDURE — 93000 ELECTROCARDIOGRAM COMPLETE: CPT | Performed by: INTERNAL MEDICINE

## 2018-09-19 RX ORDER — ROSUVASTATIN CALCIUM 20 MG/1
20 TABLET, COATED ORAL DAILY
Qty: 90 TABLET | Refills: 3 | Status: SHIPPED | OUTPATIENT
Start: 2018-09-19 | End: 2019-10-07

## 2018-09-19 RX ORDER — IRBESARTAN 300 MG/1
150 TABLET ORAL 2 TIMES DAILY
Qty: 90 TABLET | Refills: 3 | Status: SHIPPED | OUTPATIENT
Start: 2018-09-19 | End: 2018-12-07

## 2018-09-19 NOTE — MR AVS SNAPSHOT
After Visit Summary   9/19/2018    El Newell    MRN: [id number]           Patient Information     Date Of Birth          1960        Visit Information        Provider Department      9/19/2018 8:15 AM Montana Thompson MD [facility]        Today's Diagnoses     Coronary artery disease involving native coronary artery of native heart without angina pectoris    -  1    Mixed hyperlipidemia        Renal artery stenosis (H)        Hyperlipidemia with target LDL less than 130        Resistant hypertension           Follow-ups after your visit        Additional Services     Follow-Up with Cardiologist                 Future tests that were ordered for you today     Open Future Orders        Priority Expected Expires Ordered    Lipid Profile Routine 1/17/2019 9/19/2019 9/19/2018    Basic metabolic panel Routine 9/19/2019 9/20/2019 9/19/2018    Lipid Profile Routine 9/19/2019 9/20/2019 9/19/2018    Follow-Up with Cardiologist Routine 9/19/2019 9/20/2019 9/19/2018            Who to contact     If you have questions or need follow up information about today's clinic visit or your schedule please contact [facility] directly at [phone].  Normal or non-critical lab and imaging results will be communicated to you by [employer], letter or phone within 4 business days after the clinic has received the results. If you do not hear from us within 7 days, please contact the clinic through [employer] or phone. If you have a critical or abnormal lab result, we will notify you by phone as soon as possible.  Submit refill requests through [email or web address] or call your pharmacy and they will forward the refill request to us. Please allow 3 business days for your refill to be completed.          Additional Information About Your Visit        MyChart Information     [email or web address] gives you secure access to your electronic health record. If you see a  "primary care provider, you can also send messages to your care team and make appointments. If you have questions, please call your primary care clinic.  If you do not have a primary care provider, please call [phone] and they will assist you.        Care EveryWhere ID     This is your Care EveryWhere ID. This could be used by other organizations to access your [facility] medical records  [id number]        Your Vitals Were     Pulse Height BMI (Body Mass Index)             78 1.676 m (5' 6\") 25.31 kg/m2          Blood Pressure from Last 3 Encounters:   09/19/18 131/82   01/26/18 134/78   12/14/17 154/90    Weight from Last 3 Encounters:   09/19/18 71.1 kg (156 lb 12.8 oz)   01/26/18 72.8 kg (160 lb 9.6 oz)   09/27/17 73.8 kg (162 lb 12.8 oz)              We Performed the Following     EKG 12-lead complete w/read - Clinics          Today's Medication Changes          These changes are accurate as of 9/19/18  9:12 AM.  If you have any questions, ask your nurse or doctor.               Start taking these medicines.        Dose/Directions    rosuvastatin 20 MG tablet   Commonly known as:  CRESTOR   Used for:  Coronary artery disease involving native coronary artery of native heart without angina pectoris, Mixed hyperlipidemia   Started by:  Montana Thompson MD        Dose:  20 mg   Take 1 tablet (20 mg) by mouth daily   Quantity:  90 tablet   Refills:  3         Stop taking these medicines if you haven't already. Please contact your care team if you have questions.     atorvastatin 40 MG tablet   Commonly known as:  LIPITOR   Stopped by:  Montana Thompson MD                Where to get your medicines      These medications were sent to [id number]  [place] - [address]  [address] [place] [address]     Phone:  [phone]     irbesartan 300 MG tablet    rosuvastatin 20 MG tablet                Primary Care Provider Office Phone # Fax # "    Lee Dave [phone]       [address] [facility] [address]        Equal Access to Services     LIBORIO RASMUSSEN : Hadruslan aad ku hadlluvia Espinosacarolyn, camilleda xuraphaelha, toby jasminlima dennyisabella, mariza borrego dennygrayson nickerson laAustinamber carrizales. So [facility] [phone].    ATENCIÓN: Si habla español, tiene a german disposición servicios gratuitos de asistencia lingüística. Llame al [phone].    We comply with applicable federal civil rights laws and [place] laws. We do not discriminate on the basis of race, color, national origin, age, disability, sex, sexual orientation, or gender identity.            Thank you!     Thank you for choosing [facility]  for your care. Our goal is always to provide you with excellent care. Hearing back from our patients is one way we can continue to improve our services. Please take a few minutes to complete the written survey that you may receive in the mail after your visit with us. Thank you!             Your Updated Medication List - Protect others around you: Learn how to safely use, store and throw away your medicines at www.disposemymeds.org.          This list is accurate as of 9/19/18  9:12 AM.  Always use your most recent med list.                   Brand Name Dispense Instructions for use Diagnosis    aspirin 81 MG tablet      Take 81 mg by mouth daily    Essential hypertension, benign       Co Q 10 100 MG Caps      Take by mouth daily        ezetimibe 10 MG tablet    ZETIA    90 tablet    Take 1 tablet (10 mg) by mouth At Bedtime    Hyperlipidemia LDL goal <70       fish oil-omega-3 fatty acids 1000 MG capsule      Take 1 g by mouth daily        gabapentin 300 MG capsule    NEURONTIN    60 capsule    1 tablet at bedtime for 2 days, then 1 tablet twice per day.    Lumbar radiculopathy       HYDROcodone-acetaminophen 5-325 MG per tablet    NORCO    15 tablet    Take 0.5-1 tablets by mouth every 6 hours as needed for  moderate to severe pain maximum 4 tablet(s) per day    Lumbar radiculopathy       irbesartan 300 MG tablet    AVAPRO    90 tablet    Take 0.5 tablets (150 mg) by mouth 2 times daily    Hyperlipidemia with target LDL less than 130       MULTIVITAMIN ADULT PO      Take by mouth daily         MG Caps capsule   Generic drug:  acetylcysteine      Take 500 mg by mouth daily        PROBIOTIC ADVANCED Caps      Take by mouth daily        rosuvastatin 20 MG tablet    CRESTOR    90 tablet    Take 1 tablet (20 mg) by mouth daily    Coronary artery disease involving native coronary artery of native heart without angina pectoris, Mixed hyperlipidemia       vitamin B complex with vitamin C Tabs tablet      Take 1 tablet by mouth daily

## 2018-09-19 NOTE — TELEPHONE ENCOUNTER
"Per Dr. Thompson's request, called to radiology and requested radiologist review renal ultrasounds on 12/22/16 and 6/21/16, as well as CT of abdomen done on 7/5/16 to check for presence of aortic aneurysm. Aortic aneurysm measuring 3.3 cm was noted on renal ultrasound on 12/12/17. Received return call from Dr. Kulkarni, who stated he reviewed the previous studies and no aneurysm was present. Dr. Kulkarni reported at time of CT scan on 7/5/16, abdominal aorta measured \"just under 3 cm.\" Dr. Thompson made aware. Per review of chart, Dr. Yoder saw pt on 12/14/17 and per note, plans to follow the AAA at the time of his next renal artery imaging. Dr. Thompson made aware, will not order further imaging at this time. Called to pt, no answer. Left detailed VM that no additional imaging will be ordered at this time as plan is for pt to follow up on AAA with Dr. Yoder. Left Team 1 call back number if pt has any questions.   "

## 2018-09-19 NOTE — LETTER
9/19/2018    Lee Dave MD  [address] [facility] [address]    RE: El Newell       Dear Colleague,    I had the pleasure of seeing El Newell in the [facility].    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   He initially had an abnormal CT coronary angiogram around 2016 showing evidence of atherosclerosis with calcified plaque in multiple vessels as well as a history of multiple coronary artery disease risk factors.  He has had very slight symptoms of chest discomfort when he overexerted himself, but not necessarily in a consistent manner.  Coronary disease risk factors are positive for cigarette smoking, discontinued in 2006, hypertension since 2013 and hyperlipidemia since 2016.     He had rather severe dyslipidemia with LDLs averaging 165-170 prior to treatment .  there is no history of documented diabetes mellitus but there was a positive family history of premature coronary artery disease in both his mother and father in their mid 50s.           He was subsequently found to have severe renal artery stenosis and underwent successful left renal artery stenting at that time.  He also had severe right carotid atherosclerosis and had successful right CEA in 2016.  A stress test at that time was able to go 15 minutes on a Dimas protocol without symptoms..  He had a normal  stress EKG, stress nuclear perfusion had a question of small inferior defect.     This last year he has noted he continues to be able to participate in all activities, both strength training and cardio without restriction.     His blood pressure significantly improved after renal artery stenting and in fact his had  to cut back on his antihypertensives because he was having severe orthostasis.  On his current regimen he states his blood pressures are typically in the 120-130 systolic range and is having no orthostasis.  He denies orthopnea, PND, edema.  No palpitations dizziness syncope or near syncope.  Denies claudication or focal neurologic symptoms.  He does note that he likes to lift weights but after each weightlifting session he has quite significant myalgias persist for up to 2 days or more.  He did not have this when he was not on statins.     He has been on multiple drugs in the past with intolerance to ACE inhibitor, clonidine, labetalol, metoprolol, amlodipine, mostly at higher doses.  He has been intolerant to simvastatin due to myalgias.  As noted he still has some post weightlifting myalgias of concern on atorvastatin.        In December 2017 renal ultrasound showed a widely patent stent.  It incidentally reported a 3.3 cm abdominal aortic aneurysm.  This was not commented on in mid 2016 at the time of another renal ultrasound and abdominal CTA.  This will require comparison to see if there is been a change.  I have requested that radiology we reviewed the studies to let me know if there is been a interval change.  He is not having any abdominal discomfort.  I personally reviewed his abdominal CT scan from June 2016.  I do not see evidence of a abdominal aortic infrarenal aneurysm greater than 25 mm on that study.      PHYSICAL EXAMINATION-full exam below.  In summary:   VITAL SIGNS:  Showed a blood pressure 131/82, pulse 65 and regular, weight was 156 pounds, which is stable.   NECK:  Without jugular venous distention, obvious carotid bruit or palpable thyroid.  Healed right CEA incision  CHEST:    Clear.  Normal effort  CARDIAC:  Regular rhythm, 1/6 systolic murmur at the base left sternal border and apex, appears unchanged from previous reports. No diastolic murmur, rub or S3.   EXTREMITIES:  Without cyanosis  or edema.  Pulses full and without bruits.    -No abdominal bruit      Recent fasting lipid profile is reviewed with him.  LDL 76, non-.  HDL 68.  Triglycerides 137.      CLINICAL IMPRESSION:   1.     Coronary artery disease, no  angina at this time.  Young gentleman who we need to continue intense risk factor intervention.  Blood pressure controlled, lipids greater than 50% reduction in LDL and non-HDL at around 100.  No tobacco.  Excellent activity profile and exercise.  Normal fasting blood glucose recently.  Normal renal function    2.     Status post renal artery stenting.  Recent ultrasound showed widely patent stent    3.  History of resistant hypertension now with Hypertension, adequate control after renal stenting  .   4.  Hyperlipidemia  at reasonable goal.  However having what are likely side effects of atorvastatin.  He is interested in, and I have recommended, a trial of switch to rosuvastatin.  We will start at 20 mg daily.  If he tolerates this with improved symptoms for 2 months I will have him increase to 40 mg and after 2 months of that if he continues to tolerate it we will check a lipid profile.  We will see if we need to continue ezetimibe on top of that.    5.  Peripheral vascular disease with right carotid bruit, status post carotid endarterectomy for severe right internal carotid disease.     6.  Modest abdominal aortic aneurysm noted recently in December 2017.  It was not reported in June 2016.  No symptoms from this.  I will have his images reviewed to see if there is been any interval change.  If there has we will need a follow-up study at this time as it is been more than 6 months since the last study          Pending the results of the review of his ultrasound, I have switched him to rosuvastatin as above.  Continue remainder of cardiovascular regimen.  Follow-up lipids in 4 months on rosuvastatin.  Office visit follow-up in a year.    Orders Placed This Encounter   Procedures      Basic metabolic panel     Lipid Profile     Lipid Profile     Follow-Up with Cardiologist     EKG 12-lead complete w/read - Clinics       Orders Placed This Encounter   Medications     Multiple Vitamins-Minerals (MULTIVITAMIN ADULT PO)     Sig: Take by mouth daily     irbesartan (AVAPRO) 300 MG tablet     Sig: Take 0.5 tablets (150 mg) by mouth 2 times daily     Dispense:  90 tablet     Refill:  3     rosuvastatin (CRESTOR) 20 MG tablet     Sig: Take 1 tablet (20 mg) by mouth daily     Dispense:  90 tablet     Refill:  3       Medications Discontinued During This Encounter   Medication Reason     atorvastatin (LIPITOR) 40 MG tablet Alternate therapy     irbesartan (AVAPRO) 300 MG tablet Reorder         Encounter Diagnoses   Name Primary?     Coronary artery disease involving native coronary artery of native heart without angina pectoris Yes     Mixed hyperlipidemia      Renal artery stenosis (H)      Hyperlipidemia with target LDL less than 130      Resistant hypertension        CURRENT MEDICATIONS:  Current Outpatient Prescriptions   Medication Sig Dispense Refill     aspirin 81 MG tablet Take 81 mg by mouth daily        Coenzyme Q10 (CO Q 10) 100 MG CAPS Take by mouth daily       ezetimibe (ZETIA) 10 MG tablet Take 1 tablet (10 mg) by mouth At Bedtime 90 tablet 3     fish oil-omega-3 fatty acids 1000 MG capsule Take 1 g by mouth daily       irbesartan (AVAPRO) 300 MG tablet Take 0.5 tablets (150 mg) by mouth 2 times daily 90 tablet 3     Multiple Vitamins-Minerals (MULTIVITAMIN ADULT PO) Take by mouth daily       rosuvastatin (CRESTOR) 20 MG tablet Take 1 tablet (20 mg) by mouth daily 90 tablet 3     acetylcysteine (NAC) 500 MG CAPS capsule Take 500 mg by mouth daily       gabapentin (NEURONTIN) 300 MG capsule 1 tablet at bedtime for 2 days, then 1 tablet twice per day. (Patient not taking: Reported on 9/19/2018) 60 capsule 0     HYDROcodone-acetaminophen (NORCO) 5-325 MG per tablet Take 0.5-1 tablets by mouth  "every 6 hours as needed for moderate to severe pain maximum 4 tablet(s) per day (Patient not taking: Reported on 9/19/2018) 15 tablet 0     Probiotic Product (PROBIOTIC ADVANCED) CAPS Take by mouth daily       vitamin  B complex with vitamin C (VITAMIN  B COMPLEX) TABS Take 1 tablet by mouth daily       [DISCONTINUED] irbesartan (AVAPRO) 300 MG tablet Take 0.5 tablets (150 mg) by mouth 2 times daily 90 tablet 3       ALLERGIES     Allergies   Allergen Reactions     Simvastatin Muscle Pain (Myalgia)     Ace Inhibitors Cough     Carvedilol      Palpitations, syncope     Clonidine Other (See Comments)     Mental fatigue, somnolence     Labetalol Other (See Comments)     diffuse weakness, muscle cramps, stocking/glove fasiculations, urinary hesitancy, muscle cramps     Metoprolol Other (See Comments)     Severe fatigue, \"made heart jump\"     Amlodipine Other (See Comments)     Palpitations, dizziness       PAST MEDICAL HISTORY:  Past Medical History:   Diagnosis Date     Carotid stenosis, right     s/p right CEA 7/27/16     Coronary artery disease 6-6-16    per Ca+ score = TOTAL CALCIUM SCORE: 1145.9     Gastro-oesophageal reflux disease      Hyperlipidemia LDL goal < 130 8/13/14         Hypertension 8/8/14    hospitalized with hypertensive urgency 8/13/14     Lung nodule 6-6-16    Per Ca+ score - soft tissue results. indeterminate 8 mm LUNG NODULE right lung     Near syncope      Renal artery stenosis (H)     s/p PTA & BMS to left renal artery       PAST SURGICAL HISTORY:  Past Surgical History:   Procedure Laterality Date     APPENDECTOMY       ENDARTERECTOMY CAROTID Right 7/26/2016    Procedure: ENDARTERECTOMY CAROTID;  Surgeon: Vaibhav Yoder MD;  Location: [facility] OR     FUSION SPINE POSTERIOR TWO LEVELS  2011    L5-S1 spine fusion, with instrumentation       FAMILY HISTORY:  Family History   Problem Relation Age of Onset     Hypertension Mother      C.A.D. Mother 55     MI and stent age 55     Coronary " Artery Disease Mother      Myocardial Infarction Mother      Heart Surgery Mother      stents     Hypertension Father      C.A.D. Father      MI age 55, stents age 55     Coronary Artery Disease Father      Cancer Father      Myocardial Infarction Father      Heart Surgery Father      stents     Neurologic Disorder Sister 30     progressive supranuclear palsy     Hypertension Sister      Hypertension Sister      Heart Surgery Sister      stent     Hypertension Brother      Coronary Artery Disease Maternal Grandmother      Hypertension Maternal Grandmother      Heart Surgery Maternal Grandmother       during surgery     Cancer Maternal Grandfather      Leukemia Maternal Grandfather      Hypertension Paternal Grandmother      Other - See Comments Paternal Grandmother      embolism     HEART DISEASE Paternal Grandfather        SOCIAL HISTORY:  Social History     Social History     Marital status:      Spouse name: N/A     Number of children: N/A     Years of education: N/A     Occupational History     psychiatrist Self Employed.     Social History Main Topics     Smoking status: Former Smoker     Packs/day: 1.00     Years: 27.00     Start date:      Quit date: 2004     Smokeless tobacco: Never Used     Alcohol use 0.0 oz/week     0 Standard drinks or equivalent per week      Comment: 1-2 drinks day, 4 days week, scotch     Drug use: No     Sexual activity: No     Other Topics Concern     Caffeine Concern Yes     2 cups of coffee a day     Sleep Concern No     Stress Concern No     Weight Concern No     Special Diet Yes     vegetarian diet     Exercise Yes     biking,  walking x5     Bike Helmet No     Seat Belt Yes     Parent/Sibling W/ Cabg, Mi Or Angioplasty Before 65f 55m? Yes     Social History Narrative       Review of Systems:  Skin:  Negative       Eyes:  Negative      ENT:  Positive for hearing loss wears hearing aids  Respiratory:  Negative       Cardiovascular:  Negative;palpitations;chest  "pain;lightheadedness;dizziness;syncope or near-syncope;cyanosis;fatigue;exercise intolerance;edema      Gastroenterology: Negative      Genitourinary:  Negative      Musculoskeletal:  Positive for   muscle aches  Neurologic:  Negative      Psychiatric:  Negative      Heme/Lymph/Imm:  Positive for allergies    Endocrine:  Negative        Physical Exam:  Vitals: /82 (BP Location: Right arm, Cuff Size: Adult Large)  Pulse 78  Ht 1.676 m (5' 6\")  Wt 71.1 kg (156 lb 12.8 oz)  BMI 25.31 kg/m2    Constitutional:  cooperative, alert and oriented, well developed, well nourished, in no acute distress        Skin:  warm and dry to the touch;no apparent skin lesions or masses noted surgical scars well-healed        Head:  normocephalic, no masses or lesions        Eyes:  pupils equal and round;sclera white;EOMS intact        Lymph:      ENT:  no pallor or cyanosis        Neck:  carotid pulses are full and equal bilaterally;JVP normal;no carotid bruit (Healef R CEA incision)        Respiratory:  normal breath sounds, clear to auscultation, normal A-P diameter, normal symmetry, normal respiratory excursion, no use of accessory muscles         Cardiac: regular rhythm;normal S1 and S2   S4   systolic murmur;LUSB;grade 1;LLSB        pulses full and equal, no bruits auscultated                                        GI:           Extremities and Muscular Skeletal:  no deformities, clubbing, cyanosis, erythema observed;no edema              Neurological:  no gross motor deficits        Psych:  affect appropriate, oriented to time, person and place        Thank you for allowing me to participate in the care of your patient.    Sincerely,     Montana Thompson MD     [facility]    "

## 2018-09-19 NOTE — PROGRESS NOTES
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   He initially had an abnormal CT coronary angiogram around 2016 showing evidence of atherosclerosis with calcified plaque in multiple vessels as well as a history of multiple coronary artery disease risk factors.  He has had very slight symptoms of chest discomfort when he overexerted himself, but not necessarily in a consistent manner.  Coronary disease risk factors are positive for cigarette smoking, discontinued in 2006, hypertension since 2013 and hyperlipidemia since 2016.    He had rather severe dyslipidemia with LDLs averaging 165-170 prior to treatment .  there is no history of documented diabetes mellitus but there was a positive family history of premature coronary artery disease in both his mother and father in their mid 50s.           He was subsequently found to have severe renal artery stenosis and underwent successful left renal artery stenting at that time.  He also had severe right carotid atherosclerosis and had successful right CEA in 2016.  A stress test at that time was able to go 15 minutes on a Dimas protocol without symptoms..  He had a normal  stress EKG, stress nuclear perfusion had a question of small inferior defect.     This last year he has noted he continues to be able to participate in all activities, both strength training and cardio without restriction.    His blood pressure significantly improved after renal artery stenting and in fact his had to cut back on his antihypertensives because he was having severe orthostasis.  On his current regimen he states his blood pressures are typically in the 120-130 systolic range and is having no orthostasis.  He denies orthopnea,  PND, edema.  No palpitations dizziness syncope or near syncope.  Denies claudication or focal neurologic symptoms.  He does note that he likes to lift weights but after each weightlifting session he has quite significant myalgias persist for up to 2 days or more.  He did not have this when he was not on statins.     He has been on multiple drugs in the past with intolerance to ACE inhibitor, clonidine, labetalol, metoprolol, amlodipine, mostly at higher doses.  He has been intolerant to simvastatin due to myalgias.  As noted he still has some post weightlifting myalgias of concern on atorvastatin.        In December 2017 renal ultrasound showed a widely patent stent.  It incidentally reported a 3.3 cm abdominal aortic aneurysm.  This was not commented on in mid 2016 at the time of another renal ultrasound and abdominal CTA.  This will require comparison to see if there is been a change.  I have requested that radiology we reviewed the studies to let me know if there is been a interval change.  He is not having any abdominal discomfort.  I personally reviewed his abdominal CT scan from June 2016.  I do not see evidence of a abdominal aortic infrarenal aneurysm greater than 25 mm on that study.      PHYSICAL EXAMINATION-full exam below.  In summary:   VITAL SIGNS:  Showed a blood pressure 131/82, pulse 65 and regular, weight was 156 pounds, which is stable.   NECK:  Without jugular venous distention, obvious carotid bruit or palpable thyroid.  Healed right CEA incision  CHEST:   Clear.  Normal effort  CARDIAC:  Regular rhythm, 1/6 systolic murmur at the base left sternal border and apex, appears unchanged from previous reports. No diastolic murmur, rub or S3.   EXTREMITIES:  Without cyanosis or edema.  Pulses full and without bruits.    -No abdominal bruit      Recent fasting lipid profile is reviewed with him.  LDL 76, non-.  HDL 68.  Triglycerides 137.      CLINICAL IMPRESSION:   1.    Coronary artery  disease, no  angina at this time.  Young gentleman who we need to continue intense risk factor intervention.  Blood pressure controlled, lipids greater than 50% reduction in LDL and non-HDL at around 100.  No tobacco.  Excellent activity profile and exercise.  Normal fasting blood glucose recently.  Normal renal function    2.    Status post renal artery stenting.  Recent ultrasound showed widely patent stent    3.  History of resistant hypertension now with Hypertension, adequate control after renal stenting  .   4.  Hyperlipidemia at reasonable goal.  However having what are likely side effects of atorvastatin.  He is interested in, and I have recommended, a trial of switch to rosuvastatin.  We will start at 20 mg daily.  If he tolerates this with improved symptoms for 2 months I will have him increase to 40 mg and after 2 months of that if he continues to tolerate it we will check a lipid profile.  We will see if we need to continue ezetimibe on top of that.    5.  Peripheral vascular disease with right carotid bruit, status post carotid endarterectomy for severe right internal carotid disease.     6.  Modest abdominal aortic aneurysm noted recently in December 2017.  It was not reported in June 2016.  No symptoms from this.  I will have his images reviewed to see if there is been any interval change.  If there has we will need a follow-up study at this time as it is been more than 6 months since the last study          Pending the results of the review of his ultrasound, I have switched him to rosuvastatin as above.  Continue remainder of cardiovascular regimen.  Follow-up lipids in 4 months on rosuvastatin.  Office visit follow-up in a year.    Orders Placed This Encounter   Procedures     Basic metabolic panel     Lipid Profile     Lipid Profile     Follow-Up with Cardiologist     EKG 12-lead complete w/read - Clinics       Orders Placed This Encounter   Medications     Multiple Vitamins-Minerals (MULTIVITAMIN  ADULT PO)     Sig: Take by mouth daily     irbesartan (AVAPRO) 300 MG tablet     Sig: Take 0.5 tablets (150 mg) by mouth 2 times daily     Dispense:  90 tablet     Refill:  3     rosuvastatin (CRESTOR) 20 MG tablet     Sig: Take 1 tablet (20 mg) by mouth daily     Dispense:  90 tablet     Refill:  3       Medications Discontinued During This Encounter   Medication Reason     atorvastatin (LIPITOR) 40 MG tablet Alternate therapy     irbesartan (AVAPRO) 300 MG tablet Reorder         Encounter Diagnoses   Name Primary?     Coronary artery disease involving native coronary artery of native heart without angina pectoris Yes     Mixed hyperlipidemia      Renal artery stenosis (H)      Hyperlipidemia with target LDL less than 130      Resistant hypertension        CURRENT MEDICATIONS:  Current Outpatient Prescriptions   Medication Sig Dispense Refill     aspirin 81 MG tablet Take 81 mg by mouth daily        Coenzyme Q10 (CO Q 10) 100 MG CAPS Take by mouth daily       ezetimibe (ZETIA) 10 MG tablet Take 1 tablet (10 mg) by mouth At Bedtime 90 tablet 3     fish oil-omega-3 fatty acids 1000 MG capsule Take 1 g by mouth daily       irbesartan (AVAPRO) 300 MG tablet Take 0.5 tablets (150 mg) by mouth 2 times daily 90 tablet 3     Multiple Vitamins-Minerals (MULTIVITAMIN ADULT PO) Take by mouth daily       rosuvastatin (CRESTOR) 20 MG tablet Take 1 tablet (20 mg) by mouth daily 90 tablet 3     acetylcysteine (NAC) 500 MG CAPS capsule Take 500 mg by mouth daily       gabapentin (NEURONTIN) 300 MG capsule 1 tablet at bedtime for 2 days, then 1 tablet twice per day. (Patient not taking: Reported on 9/19/2018) 60 capsule 0     HYDROcodone-acetaminophen (NORCO) 5-325 MG per tablet Take 0.5-1 tablets by mouth every 6 hours as needed for moderate to severe pain maximum 4 tablet(s) per day (Patient not taking: Reported on 9/19/2018) 15 tablet 0     Probiotic Product (PROBIOTIC ADVANCED) CAPS Take by mouth daily       vitamin  B complex  "with vitamin C (VITAMIN  B COMPLEX) TABS Take 1 tablet by mouth daily       [DISCONTINUED] irbesartan (AVAPRO) 300 MG tablet Take 0.5 tablets (150 mg) by mouth 2 times daily 90 tablet 3       ALLERGIES     Allergies   Allergen Reactions     Simvastatin Muscle Pain (Myalgia)     Ace Inhibitors Cough     Carvedilol      Palpitations, syncope     Clonidine Other (See Comments)     Mental fatigue, somnolence     Labetalol Other (See Comments)     diffuse weakness, muscle cramps, stocking/glove fasiculations, urinary hesitancy, muscle cramps     Metoprolol Other (See Comments)     Severe fatigue, \"made heart jump\"     Amlodipine Other (See Comments)     Palpitations, dizziness       PAST MEDICAL HISTORY:  Past Medical History:   Diagnosis Date     Carotid stenosis, right     s/p right CEA 7/27/16     Coronary artery disease 6-6-16    per Ca+ score = TOTAL CALCIUM SCORE: 1145.9     Gastro-oesophageal reflux disease      Hyperlipidemia LDL goal < 130 8/13/14         Hypertension 8/8/14    hospitalized with hypertensive urgency 8/13/14     Lung nodule 6-6-16    Per Ca+ score - soft tissue results. indeterminate 8 mm LUNG NODULE right lung     Near syncope      Renal artery stenosis (H)     s/p PTA & BMS to left renal artery       PAST SURGICAL HISTORY:  Past Surgical History:   Procedure Laterality Date     APPENDECTOMY       ENDARTERECTOMY CAROTID Right 7/26/2016    Procedure: ENDARTERECTOMY CAROTID;  Surgeon: Vaibhav Yoder MD;  Location: [facility] OR     FUSION SPINE POSTERIOR TWO LEVELS  2011    L5-S1 spine fusion, with instrumentation       FAMILY HISTORY:  Family History   Problem Relation Age of Onset     Hypertension Mother      C.A.D. Mother 55     MI and stent age 55     Coronary Artery Disease Mother      Myocardial Infarction Mother      Heart Surgery Mother      stents     Hypertension Father      C.A.D. Father      MI age 55, stents age 55     Coronary Artery Disease Father      Cancer Father      " Myocardial Infarction Father      Heart Surgery Father      stents     Neurologic Disorder Sister 30     progressive supranuclear palsy     Hypertension Sister      Hypertension Sister      Heart Surgery Sister      stent     Hypertension Brother      Coronary Artery Disease Maternal Grandmother      Hypertension Maternal Grandmother      Heart Surgery Maternal Grandmother       during surgery     Cancer Maternal Grandfather      Leukemia Maternal Grandfather      Hypertension Paternal Grandmother      Other - See Comments Paternal Grandmother      embolism     HEART DISEASE Paternal Grandfather        SOCIAL HISTORY:  Social History     Social History     Marital status:      Spouse name: N/A     Number of children: N/A     Years of education: N/A     Occupational History     psychiatrist Self Employed.     Social History Main Topics     Smoking status: Former Smoker     Packs/day: 1.00     Years: 27.00     Start date:      Quit date: 2004     Smokeless tobacco: Never Used     Alcohol use 0.0 oz/week     0 Standard drinks or equivalent per week      Comment: 1-2 drinks day, 4 days week, scotch     Drug use: No     Sexual activity: No     Other Topics Concern     Caffeine Concern Yes     2 cups of coffee a day     Sleep Concern No     Stress Concern No     Weight Concern No     Special Diet Yes     vegetarian diet     Exercise Yes     biking,  walking x5     Bike Helmet No     Seat Belt Yes     Parent/Sibling W/ Cabg, Mi Or Angioplasty Before 65f 55m? Yes     Social History Narrative       Review of Systems:  Skin:  Negative       Eyes:  Negative      ENT:  Positive for hearing loss wears hearing aids  Respiratory:  Negative       Cardiovascular:  Negative;palpitations;chest pain;lightheadedness;dizziness;syncope or near-syncope;cyanosis;fatigue;exercise intolerance;edema      Gastroenterology: Negative      Genitourinary:  Negative      Musculoskeletal:  Positive for   muscle aches  Neurologic:   "Negative      Psychiatric:  Negative      Heme/Lymph/Imm:  Positive for allergies    Endocrine:  Negative        Physical Exam:  Vitals: /82 (BP Location: Right arm, Cuff Size: Adult Large)  Pulse 78  Ht 1.676 m (5' 6\")  Wt 71.1 kg (156 lb 12.8 oz)  BMI 25.31 kg/m2    Constitutional:  cooperative, alert and oriented, well developed, well nourished, in no acute distress        Skin:  warm and dry to the touch;no apparent skin lesions or masses noted surgical scars well-healed        Head:  normocephalic, no masses or lesions        Eyes:  pupils equal and round;sclera white;EOMS intact        Lymph:      ENT:  no pallor or cyanosis        Neck:  carotid pulses are full and equal bilaterally;JVP normal;no carotid bruit (Healef R CEA incision)        Respiratory:  normal breath sounds, clear to auscultation, normal A-P diameter, normal symmetry, normal respiratory excursion, no use of accessory muscles         Cardiac: regular rhythm;normal S1 and S2   S4   systolic murmur;LUSB;grade 1;LLSB        pulses full and equal, no bruits auscultated                                        GI:           Extremities and Muscular Skeletal:  no deformities, clubbing, cyanosis, erythema observed;no edema              Neurological:  no gross motor deficits        Psych:  affect appropriate, oriented to time, person and place        CC  No referring provider defined for this encounter.              "

## 2018-11-28 DIAGNOSIS — E78.5 HYPERLIPIDEMIA LDL GOAL <70: ICD-10-CM

## 2018-11-28 RX ORDER — EZETIMIBE 10 MG/1
10 TABLET ORAL AT BEDTIME
Qty: 90 TABLET | Refills: 3 | Status: SHIPPED | OUTPATIENT
Start: 2018-11-28 | End: 2019-12-06

## 2018-12-07 ENCOUNTER — OFFICE VISIT (OUTPATIENT)
Dept: INTERNAL MEDICINE | Facility: CLINIC | Age: 58
End: 2018-12-07
Payer: COMMERCIAL

## 2018-12-07 VITALS
TEMPERATURE: 98 F | BODY MASS INDEX: 25.7 KG/M2 | HEART RATE: 67 BPM | SYSTOLIC BLOOD PRESSURE: 132 MMHG | RESPIRATION RATE: 16 BRPM | WEIGHT: 159.2 LBS | OXYGEN SATURATION: 98 % | DIASTOLIC BLOOD PRESSURE: 80 MMHG

## 2018-12-07 DIAGNOSIS — L29.0 PRURITUS ANI: ICD-10-CM

## 2018-12-07 DIAGNOSIS — Z12.11 SCREEN FOR COLON CANCER: ICD-10-CM

## 2018-12-07 DIAGNOSIS — K64.4 EXTERNAL HEMORRHOID: ICD-10-CM

## 2018-12-07 DIAGNOSIS — I70.1 RENAL ARTERY STENOSIS (H): ICD-10-CM

## 2018-12-07 DIAGNOSIS — I71.40 AAA (ABDOMINAL AORTIC ANEURYSM) WITHOUT RUPTURE (H): ICD-10-CM

## 2018-12-07 DIAGNOSIS — E78.5 HYPERLIPIDEMIA WITH TARGET LDL LESS THAN 130: ICD-10-CM

## 2018-12-07 DIAGNOSIS — I25.10 CORONARY ARTERY DISEASE INVOLVING NATIVE CORONARY ARTERY OF NATIVE HEART WITHOUT ANGINA PECTORIS: ICD-10-CM

## 2018-12-07 DIAGNOSIS — J01.01 ACUTE RECURRENT MAXILLARY SINUSITIS: Primary | ICD-10-CM

## 2018-12-07 PROCEDURE — 99213 OFFICE O/P EST LOW 20 MIN: CPT | Performed by: INTERNAL MEDICINE

## 2018-12-07 RX ORDER — IRBESARTAN 300 MG/1
150 TABLET ORAL 2 TIMES DAILY
Qty: 90 TABLET | Refills: 3 | Status: SHIPPED | OUTPATIENT
Start: 2018-12-07 | End: 2019-12-19

## 2018-12-07 NOTE — MR AVS SNAPSHOT
"              After Visit Summary   12/7/2018    El Newell    MRN: [id number]           Patient Information     Date Of Birth          1960        Visit Information        Provider Department      12/7/2018 3:40 PM Lee Dave MD [facility]        Today's Diagnoses     Acute recurrent maxillary sinusitis    -  1    Screen for colon cancer          Care Instructions    *  ENT to evaluate for \"plumbing\" issues in the sinuses causing these recurrent sinus infection.        PRURITIS ANI:  ==========================================================    Pruritis ani is itching of the skin around the anus without any specific dermatological disorder.  It is caused by a vicious cycle of irritation-->itching--> rubbing-->thickening/irritation--> etc.    This can be cause by either poor cleansing of the area usually associated with difficult or incomplete defecation, it may also be caused by excessive overcleansing of the area as well, it may also be caused by excessive moisture as well.      Treatment is aimed at breaking the above mentioned cycle.  The treatment consists of proper cleaning, ensuring good BMs.  Witch hazel (the main ingredient in Preparation H) can help with cleaning.  ALso using talcum powder or Gold Bond Powder can help as well.      Colon Cancer Screening:  ============================================================  *  All men and women are recommended to have some sort of formal colon cancer screening starting at age of 50 (or earlier if indicated based on family history of colon cancer.     *  Colon cancer is a preventable type of cancer that if caught early can be easily treated even before it becomes cancer by removing the precancerous.      *  Common Colon cancer screening options:     --Colonoscopy (every 10 years if normal exam, no family history of colon cancer)  I place order and you will be contacted to arrange this procedure.   Pros: most " "complete and thorough exam, identify and remove any pre-cancerous polyps.   Cons: prep before procedure, sedation required, possible cost     --ColoGuard Stool test every 3 years (be sure to confirm coverage by insurance).  This test checks for colon cancer specific DNA in the stool.  You will receive the collection kit in the mail.  Return the samples via mail.  If positive, you do not necessarily have colon cancer, but will need a colonoscopy.    Pros: easy to collect and return, decent specific screening test, newer test  Cons: cost     --\"FIT\" Stool test once per year.    Return the \"FIT\" stool card test to us via mail.  This is a basic level screening for colon cancer by detecting trace amount of occult blood in the intestinal tract.  If the FIT test shows any traces of occult blood, it does NOT necessarily mean that you have colon cancer, it just means that we should probably consider doing the colonoscopy.   Pros: easy to collect, cheap  Cons: not very specific, high false positive rate                    Follow-ups after your visit        Additional Services     OTOLARYNGOLOGY REFERRAL       Your provider has referred you to:     --[facility] [place] [facility] [place] [phone]   [email or web address]    Please be aware that coverage of these services is subject to the terms and limitations of your health insurance plan.  Call member services at your health plan with any benefit or coverage questions.      Please bring the following with you to your appointment:    (1) Any X-Rays, CTs or MRIs which have been performed.  Contact the facility where they were done to arrange for  prior to your scheduled appointment.   (2) List of current medications  (3) This referral request   (4) Any documents/labs given to you for this referral                  Your next 10 appointments already scheduled     Jan 08, 2019  8:10 AM CST   LAB with [facility]   [facility] [place] ([facility])    [address]  [place]  [place] [address] [phone]           Please do not eat 10-12 hours before your appointment if you are coming in fasting for labs on lipids, cholesterol, or glucose (sugar). This does not apply to pregnant women. Water, hot tea and black coffee (with nothing added) are okay. Do not drink other fluids, diet soda or chew gum.              Who to contact     If you have questions or need follow up information about today's clinic visit or your schedule please contact [facility] directly at [phone].  Normal or non-critical lab and imaging results will be communicated to you by Cronotehart, letter or phone within 4 business days after the clinic has received the results. If you do not hear from us within 7 days, please contact the clinic through Cronotehart or phone. If you have a critical or abnormal lab result, we will notify you by phone as soon as possible.  Submit refill requests through Beijingyicheng or call your pharmacy and they will forward the refill request to us. Please allow 3 business days for your refill to be completed.          Additional Information About Your Visit        Cronotehart Information     Be[employer] gives you secure access to your electronic health record. If you see a primary care provider, you can also send messages to your care team and make appointments. If you have questions, please call your primary care clinic.  If you do not have a primary care provider, please call [phone] and they will assist you.        Care EveryWhere ID     This is your Care EveryWhere ID. This could be used by other organizations to access your [place] medical records  [id number]        Your Vitals Were     Pulse Temperature Respirations Pulse Oximetry BMI (Body Mass Index)       67 98  F (36.7  C) (Oral) 16 98% 25.7 kg/m2        Blood Pressure from Last 3 Encounters:   12/07/18 132/80    09/19/18 131/82   01/26/18 134/78    Weight from Last 3 Encounters:   12/07/18 159 lb 3.2 oz (72.2 kg)   09/19/18 156 lb 12.8 oz (71.1 kg)   01/26/18 160 lb 9.6 oz (72.8 kg)              We Performed the Following     OTOLARYNGOLOGY REFERRAL          Where to get your medicines      These medications were sent to [id number] - [place], [place] - [address] & [facility]  [address] [place] [address]     Phone:  [phone]     irbesartan 300 MG tablet          Primary Care Provider Office Phone # Fax #    Lee Dave [phone]       [address] [facility] [address]        Equal Access to Services     [facility] Center[facility]ISTEL : Hadii monica martin hadasho Socarolyn, waaxda luqadaha, qaybta kaalmada adeegyada, mariza leblanc hayamber mcguire . So [facility] [phone].    ATENCIÓN: Si habla español, tiene a german disposición servicios gratuitos de asistencia lingüística. [facility] [phone].    We comply with applicable federal civil rights laws and [place] laws. We do not discriminate on the basis of race, color, national origin, age, disability, sex, sexual orientation, or gender identity.            Thank you!     Thank you for choosing [facility]  for your care. Our goal is always to provide you with excellent care. Hearing back from our patients is one way we can continue to improve our services. Please take a few minutes to complete the written survey that you may receive in the mail after your visit with us. Thank you!             Your Updated Medication List - Protect others around you: Learn how to safely use, store and throw away your medicines at www.disposemymeds.org.          This list is accurate as of 12/7/18  4:23 PM.  Always use your most recent med list.                   Brand Name Dispense Instructions for use Diagnosis    aspirin 81 MG tablet    ASA     Take 81 mg by mouth daily    Essential hypertension,  benign       Co Q 10 100 MG Caps      Take by mouth daily        ezetimibe 10 MG tablet    ZETIA    90 tablet    Take 1 tablet (10 mg) by mouth At Bedtime    Hyperlipidemia LDL goal <70       fish oil-omega-3 fatty acids 1000 MG capsule      Take 1 g by mouth daily        irbesartan 300 MG tablet    AVAPRO    90 tablet    Take 0.5 tablets (150 mg) by mouth 2 times daily    Hyperlipidemia with target LDL less than 130       MULTIVITAMIN ADULT PO      Take by mouth daily         MG Caps capsule   Generic drug:  acetylcysteine      Take 500 mg by mouth daily        PROBIOTIC ADVANCED Caps      Take by mouth daily        rosuvastatin 20 MG tablet    CRESTOR    90 tablet    Take 1 tablet (20 mg) by mouth daily    Coronary artery disease involving native coronary artery of native heart without angina pectoris, Mixed hyperlipidemia       vitamin B complex with vitamin C tablet      Take 1 tablet by mouth daily

## 2018-12-07 NOTE — PATIENT INSTRUCTIONS
"*  ENT to evaluate for \"plumbing\" issues in the sinuses causing these recurrent sinus infection.        PRURITIS ANI:  ==========================================================    Pruritis ani is itching of the skin around the anus without any specific dermatological disorder.  It is caused by a vicious cycle of irritation-->itching--> rubbing-->thickening/irritation--> etc.    This can be cause by either poor cleansing of the area usually associated with difficult or incomplete defecation, it may also be caused by excessive overcleansing of the area as well, it may also be caused by excessive moisture as well.      Treatment is aimed at breaking the above mentioned cycle.  The treatment consists of proper cleaning, ensuring good BMs.  Witch hazel (the main ingredient in Preparation H) can help with cleaning.  ALso using talcum powder or Gold Bond Powder can help as well.      Colon Cancer Screening:  ============================================================  *  All men and women are recommended to have some sort of formal colon cancer screening starting at age of 50 (or earlier if indicated based on family history of colon cancer.     *  Colon cancer is a preventable type of cancer that if caught early can be easily treated even before it becomes cancer by removing the precancerous.      *  Common Colon cancer screening options:     --Colonoscopy (every 10 years if normal exam, no family history of colon cancer)  I place order and you will be contacted to arrange this procedure.   Pros: most complete and thorough exam, identify and remove any pre-cancerous polyps.   Cons: prep before procedure, sedation required, possible cost     --ColoGuard Stool test every 3 years (be sure to confirm coverage by insurance).  This test checks for colon cancer specific DNA in the stool.  You will receive the collection kit in the mail.  Return the samples via mail.  If positive, you do not necessarily have colon cancer, but " "will need a colonoscopy.    Pros: easy to collect and return, decent specific screening test, newer test  Cons: cost     --\"FIT\" Stool test once per year.    Return the \"FIT\" stool card test to us via mail.  This is a basic level screening for colon cancer by detecting trace amount of occult blood in the intestinal tract.  If the FIT test shows any traces of occult blood, it does NOT necessarily mean that you have colon cancer, it just means that we should probably consider doing the colonoscopy.   Pros: easy to collect, cheap  Cons: not very specific, high false positive rate            "

## 2018-12-07 NOTE — PROGRESS NOTES
"  SUBJECTIVE:   El Newell is a 58 year old male who presents to clinic today for the following health issues:      1.  Patient states he has a lesion on his anal area.  Feels itching and irritation.  Describes bowel movements 1-2 times a day formed in the past nontender.  Uses baby wipes.  States he bathes below the waist twice per day.  No melena, no bloody stools.    2.  Reports chronic sinus infection multiple times per year.  States that he has had ongoing sinus infections over the last several years.  Prescribed antibiotics for himself sometimes taking antibiotics for a month to resolve the infection.  Has lots of pain pressure and itching in his sinuses.  He has never seen an ear nose and throat.  Denies facial trauma      3.  abdominal aortic aneurysm seen on last reanl ultrasound     ULTRASOUND RETROPERITONEAL LIMITED 12/12/2017 8:57 AM      HISTORY: History of right carotid endarterectomy and renal stent.  Renal artery stenosis (H).     COMPARISON: 12/22/2016.     FINDINGS: The left kidney measures 10.9 x 5.3 x 5.7 cm. The cortex  measures 1.5 cm. The left renal artery is patent. A stent noted in the  proximal left renal artery. Peak systolic velocity in left renal  artery is 217 cm/s. Arcuate artery resistive indices measure  0.59-0.65. The left renal vein is patent.     Aortic aneurysm noted measuring up to 3.3 cm.    Wonders about follow-up ultrasounds.    CT angiogram in 2016 described the abdominal aorta as \"non-aneurysmal\"    Problem list and histories reviewed & adjusted, as indicated.  Additional history: as documented        Reviewed and updated as needed this visit by clinical staff  Allergies  Meds       Reviewed and updated as needed this visit by Provider           Past Medical History:  ---------------------------  Past Medical History:   Diagnosis Date     Carotid stenosis, right     s/p right CEA 7/27/16     Coronary artery disease 6-6-16    per Ca+ score = TOTAL CALCIUM SCORE: " 1145.9     Gastro-oesophageal reflux disease      Hyperlipidemia LDL goal < 130 8/13/14         Hypertension 8/8/14    hospitalized with hypertensive urgency 8/13/14     Lung nodule 6-6-16    Per Ca+ score - soft tissue results. indeterminate 8 mm LUNG NODULE right lung     Near syncope      Renal artery stenosis (H)     s/p PTA & BMS to left renal artery       Past Surgical History:  ---------------------------  Past Surgical History:   Procedure Laterality Date     APPENDECTOMY       ENDARTERECTOMY CAROTID Right 7/26/2016    Procedure: ENDARTERECTOMY CAROTID;  Surgeon: Vaibhav Yoder MD;  Location: [facility] OR     FUSION SPINE POSTERIOR TWO LEVELS  2011    L5-S1 spine fusion, with instrumentation       Current Medications:  ---------------------------  Current Outpatient Prescriptions   Medication Sig Dispense Refill     acetylcysteine (NAC) 500 MG CAPS capsule Take 500 mg by mouth daily       aspirin 81 MG tablet Take 81 mg by mouth daily        Coenzyme Q10 (CO Q 10) 100 MG CAPS Take by mouth daily       ezetimibe (ZETIA) 10 MG tablet Take 1 tablet (10 mg) by mouth At Bedtime 90 tablet 3     fish oil-omega-3 fatty acids 1000 MG capsule Take 1 g by mouth daily       irbesartan (AVAPRO) 300 MG tablet Take 0.5 tablets (150 mg) by mouth 2 times daily 90 tablet 3     Multiple Vitamins-Minerals (MULTIVITAMIN ADULT PO) Take by mouth daily       Probiotic Product (PROBIOTIC ADVANCED) CAPS Take by mouth daily       rosuvastatin (CRESTOR) 20 MG tablet Take 1 tablet (20 mg) by mouth daily 90 tablet 3     vitamin  B complex with vitamin C (VITAMIN  B COMPLEX) TABS Take 1 tablet by mouth daily       [DISCONTINUED] irbesartan (AVAPRO) 300 MG tablet Take 0.5 tablets (150 mg) by mouth 2 times daily 90 tablet 3       Allergies:  -------------  Allergies   Allergen Reactions     Simvastatin Muscle Pain (Myalgia)     Ace Inhibitors Cough     Carvedilol      Palpitations, syncope     Clonidine Other (See Comments)      "Mental fatigue, somnolence     Labetalol Other (See Comments)     diffuse weakness, muscle cramps, stocking/glove fasiculations, urinary hesitancy, muscle cramps     Metoprolol Other (See Comments)     Severe fatigue, \"made heart jump\"     Amlodipine Other (See Comments)     Palpitations, dizziness       Social History:  -------------------  Social History     Social History     Marital status:      Spouse name: N/A     Number of children: N/A     Years of education: N/A     Occupational History     psychiatrist Self Employed.     Social History Main Topics     Smoking status: Former Smoker     Packs/day: 1.00     Years: 27.00     Start date: 1977     Quit date: 8/18/2004     Smokeless tobacco: Never Used     Alcohol use 0.0 oz/week     0 Standard drinks or equivalent per week      Comment: 1-2 drinks day, 4 days week, scotch     Drug use: No     Sexual activity: No     Other Topics Concern     Caffeine Concern Yes     2 cups of coffee a day     Sleep Concern No     Stress Concern No     Weight Concern No     Special Diet Yes     vegetarian diet     Exercise Yes     biking,  walking x5     Bike Helmet No     Seat Belt Yes     Parent/Sibling W/ Cabg, Mi Or Angioplasty Before 65f 55m? Yes     Social History Narrative       Family Medical History:  ------------------------------  Family History   Problem Relation Age of Onset     Hypertension Mother      C.A.D. Mother 55     MI and stent age 55     Coronary Artery Disease Mother      Myocardial Infarction Mother      Heart Surgery Mother      stents     Hypertension Father      C.A.D. Father      MI age 55, stents age 55     Coronary Artery Disease Father      Cancer Father      Myocardial Infarction Father      Heart Surgery Father      stents     Neurologic Disorder Sister 30     progressive supranuclear palsy     Hypertension Sister      Hypertension Sister      Heart Surgery Sister      stent     Hypertension Brother      Coronary Artery Disease Maternal " Grandmother      Hypertension Maternal Grandmother      Heart Surgery Maternal Grandmother       during surgery     Cancer Maternal Grandfather      Leukemia Maternal Grandfather      Hypertension Paternal Grandmother      Other - See Comments Paternal Grandmother      embolism     Heart Disease Paternal Grandfather          ROS:  REVIEW OF SYSTEMS:    RESP: negative for cough, dyspnea, wheezing, hemoptysis  CV: negative for chest pain, palpitations, PND, [place], orthopnea; reports no changes in their ability to perform physical activity (from cardiovascular standpoint)  GI: negative for dysphagia, N/V, pain, melena, diarrhea and constipation  NEURO: negative for numbness/tingling, paralysis, incoordination, or focal weakness     OBJECTIVE:                                                    /80  Pulse 67  Temp 98  F (36.7  C) (Oral)  Resp 16  Wt 159 lb 3.2 oz (72.2 kg)  SpO2 98%  BMI 25.7 kg/m2     GENERAL alert and no distress  EYES:  Normal sclera,conjunctiva, EOMI  HENT: oral and posterior pharynx without lesions or erythema, facies symmetric  PSYCH: Alert and oriented times 3; speech- coherent  SKIN:  No obvious significant skin lesions on visible portions of face   ANAL/RECTAL:  No cysts, minor erythema in the perineal area, no drainage.   Small skin tag/old external hemorrhoid in the 6 o'clock position at the anal verge, no rectal masses         ASSESSMENT/PLAN:                                                      (J01.01) Acute recurrent maxillary sinusitis  (primary encounter diagnosis)  Comment: Due to his recurrent sinus infections he clearly needs evaluation with ENT.  Due to the degree of inflammation itching with his infections, recommended he use short course prednisone 20 mg once a day for 5 days for his next episode.  Recommended sinus rinse kit and Mucinex as needed  Plan: OTOLARYNGOLOGY REFERRAL    (L29.0) Pruritus ani  Comment: Discussed pruritis ani in general.  Discussed cycle of  irritation, itching associated with pruritis ani.   Cautioned against overcleansing.   Rec getting stools large and soft to take pressure off rectum.  Rec Prep H wipes and creams as neede ( or any other wicth jacobo containing products)   Plan:     (K64.4) External hemorrhoid  Comment: small ext hemorrhoid vs. hypertrophied anal papilla.  No evidence for mass or cyst.  Does not appear large enough to merit removal.  Suspect his symptoms are mainly related to the perianal irritation.  Avoid over cleansing, use Preparation H cream or cooling gel, consider Preparation H wipes, consider zinc oxide/Desitin ointment.    Plan:       (Z12.11) Screen for colon cancer  Comment: Discussed current colon cancer screening recommendations calling for colonoscopy as gold standard for colon cancer screening.  Instructed them to check with their insurance coverage to see what is covered, and then referral given for colonoscopy.    If colonoscopy not covered or the patient does not want to do this study, then we can do a FIT test annually, but I told them that this is a much inferior screening method for colon cancer.    Plan: He preferred ColoGuard test if covered.       (I70.1) Renal artery stenosis (H)  Comment: This condition is currently controlled on the current medical regimen.  Continue current therapy.   Plan:     (I25.10) Coronary artery disease involving native coronary artery of native heart without angina pectoris  Comment: We will get a new cardiologist in January, his former cardiologist retired.    The patient does not report any signs or symptoms of angina or active cardiac ischemia.   They do not report any relative changes in their ability to perform physical activities over the past year.    We discussed aggressive secondary risk factor modification, including aggressive BP control (under 130/ideally), aggressive LDL lowering with statin (goal under 100 for sure/under 70 ideally), no smoking, diabetes  prevention/management, no smoking, and use of either ASA or similar anti platelet agent if tolerated.     Plan:     (I71.4) AAA (abdominal aortic aneurysm) without rupture (H)  Comment: Should have annual abdominal ultrasounds for screening.  Offered to order abdominal ultrasound now but he preferred to review this with his new cardiologist in January.  Discussed secondary risk factor modification and recommended continuing aggressive management of these items.   Plan:      See Patient Instructions    CARMELINA KOEHLER M.D., MD  [facility]

## 2019-01-08 ENCOUNTER — TELEPHONE (OUTPATIENT)
Dept: CARDIOLOGY | Facility: CLINIC | Age: 59
End: 2019-01-08

## 2019-01-08 DIAGNOSIS — I25.10 CORONARY ARTERY DISEASE INVOLVING NATIVE CORONARY ARTERY OF NATIVE HEART WITHOUT ANGINA PECTORIS: ICD-10-CM

## 2019-01-08 DIAGNOSIS — E78.2 MIXED HYPERLIPIDEMIA: ICD-10-CM

## 2019-01-08 LAB
CHOLEST SERPL-MCNC: 156 MG/DL
HDLC SERPL-MCNC: 63 MG/DL
LDLC SERPL CALC-MCNC: 72 MG/DL
NONHDLC SERPL-MCNC: 93 MG/DL
TRIGL SERPL-MCNC: 105 MG/DL

## 2019-01-08 PROCEDURE — 36415 COLL VENOUS BLD VENIPUNCTURE: CPT | Performed by: INTERNAL MEDICINE

## 2019-01-08 PROCEDURE — 80061 LIPID PANEL: CPT | Performed by: INTERNAL MEDICINE

## 2019-01-09 NOTE — TELEPHONE ENCOUNTER
Received message from Dr. Thompson:  [place] Thompson MD Haley, Leandra K, RN   Caller: Unspecified (1 week ago)             FLP ok. Is he on 20 or 40 of crestor. Would like to try 40 if he is not on that          Left detailed message for pt to call back and discuss lab results and to check what dose of rosuvastatin he is taking. Left team 1's call back number.

## 2019-01-16 NOTE — TELEPHONE ENCOUNTER
Left detailed message for pt regarding Dr. Thompson's recommendations and to make sure he is taking 40mg of Crestor. Left team 1's call back number for pt to call back if he has any questions or concerns.

## 2019-02-06 ENCOUNTER — TELEPHONE (OUTPATIENT)
Dept: INTERNAL MEDICINE | Facility: CLINIC | Age: 59
End: 2019-02-06

## 2019-02-06 DIAGNOSIS — Z12.11 SPECIAL SCREENING FOR MALIGNANT NEOPLASMS, COLON: Primary | ICD-10-CM

## 2019-02-06 NOTE — TELEPHONE ENCOUNTER
Panel Management Review      :       Composite cancer screening  Chart review shows that this patient is due/due soon for the following Colonoscopy  Summary:    Patient is due/failing the following:   COLONOSCOPY    Action needed:   Schedule colonoscopy     Type of outreach:    Sent letter.    Questions for provider review:    None                                                                                                                                    Fern Hatfield       Chart routed to Care Team .

## 2019-02-06 NOTE — LETTER
[facility]  [address] [address]  [phone]  February 6, 2019    El Newell  [address] [facility] [address]    Dear El,    We care about your health and based on a review of your medical records, recommend the the following, to better manage your health:      You are in particular need of attention regarding:  -Colon Cancer Screening    I am recommending that you:     -schedule a COLONOSCOPY to look for colon cancer (due every 10 years or 5 years in higher risk situations.)        Colon cancer is now the second leading cause of cancer-related deaths in the [place] for both men and women and there are over 130,000 new cases and 50,000 deaths per year from colon cancer.  Colonoscopies can prevent 90-95% of these deaths.  Problem lesions can be removed before they ever become cancer.  This test is not only looking for cancer, but also getting rid of precancerious lesions.    If you are under/uninsured, we recommend you contact the [employer] program. [employer] is a free colorectal cancer screening program that provides colonoscopies for eligible under/uninsured [place] men and women. If you are interested in receiving a free colonoscopy, please call [employer] at [phone] (mention code [employer]) to see if you re eligible.      If you do not wish to do a colonoscopy or cannot afford to do one, at this time, there is another option. It is called a FIT test or Fecal Immunochemical Occult Blood Test (take home stool sample kit).  It does not replace the colonoscopy for colorectal cancer screening, but it can detect hidden bleeding in the lower colon.  It does need to be repeated every year and if a positive result is obtained, you would be referred for a colonoscopy.          If you have completed either one of these tests at another facility, please call with the details of when and where the tests were  done and if they were normal or not. Or have the records sent to our clinic so that we can best coordinate your care.      Here is a list of Health Maintenance topics that are due now or due soon:  Health Maintenance Due   Topic Date Due     HIV SCREEN (SYSTEM ASSIGNED)  05/07/1979     Hepatitis C Screening  05/07/1979     Colon Cancer Screening - every 10 years.  05/07/2011     Zoster (Shingles) Vaccine (1 of 2) 05/07/2011     Discuss Advance Directive Planning  05/07/2016       Please call us at [phone] or [phone] (or use [phone]) to address the above recommendations.     Thank you for trusting [facility].  We appreciate the opportunity to serve you and look forward to supporting your healthcare needs in the future.    If you have (or plan to have) any of these tests done at a facility other than a [facility] or a [facility], please have the results from these tests sent to your primary physician at [facility].    Healthy Regards,    Lee Dave MD/[facility] [place]

## 2019-04-04 DIAGNOSIS — I65.21 STENOSIS OF RIGHT CAROTID ARTERY: ICD-10-CM

## 2019-04-04 DIAGNOSIS — I70.1 RENAL ARTERY STENOSIS (H): Primary | ICD-10-CM

## 2019-04-04 DIAGNOSIS — I71.40 AAA (ABDOMINAL AORTIC ANEURYSM) WITHOUT RUPTURE (H): ICD-10-CM

## 2019-08-13 ENCOUNTER — HOSPITAL ENCOUNTER (OUTPATIENT)
Dept: ULTRASOUND IMAGING | Facility: CLINIC | Age: 59
End: 2019-08-13
Attending: SURGERY
Payer: COMMERCIAL

## 2019-08-13 ENCOUNTER — HOSPITAL ENCOUNTER (OUTPATIENT)
Dept: ULTRASOUND IMAGING | Facility: CLINIC | Age: 59
Discharge: HOME | End: 2019-08-13
Attending: SURGERY | Admitting: SURGERY
Payer: COMMERCIAL

## 2019-08-13 DIAGNOSIS — I65.21 STENOSIS OF RIGHT CAROTID ARTERY: ICD-10-CM

## 2019-08-13 DIAGNOSIS — I70.1 RENAL ARTERY STENOSIS (H): ICD-10-CM

## 2019-08-13 PROCEDURE — 93976 VASCULAR STUDY: CPT

## 2019-08-13 PROCEDURE — 93978 VASCULAR STUDY: CPT

## 2019-08-13 PROCEDURE — 93880 EXTRACRANIAL BILAT STUDY: CPT

## 2019-09-29 ENCOUNTER — HEALTH MAINTENANCE LETTER (OUTPATIENT)
Age: 59
End: 2019-09-29

## 2019-10-07 DIAGNOSIS — I25.10 CORONARY ARTERY DISEASE INVOLVING NATIVE CORONARY ARTERY OF NATIVE HEART WITHOUT ANGINA PECTORIS: ICD-10-CM

## 2019-10-07 DIAGNOSIS — E78.2 MIXED HYPERLIPIDEMIA: ICD-10-CM

## 2019-10-07 RX ORDER — ROSUVASTATIN CALCIUM 20 MG/1
20 TABLET, COATED ORAL DAILY
Qty: 90 TABLET | Refills: 3 | Status: SHIPPED | OUTPATIENT
Start: 2019-10-07 | End: 2020-10-20

## 2019-10-15 ENCOUNTER — TELEPHONE (OUTPATIENT)
Dept: OTHER | Facility: CLINIC | Age: 59
End: 2019-10-15

## 2019-10-15 NOTE — TELEPHONE ENCOUNTER
[place] [facility]    El Newell returns for vascular follow-up.  This 59-year-old patient had undergone extended right CEA with distal facial vein patch for high-grade asymptomatic 90% stenosis on 7/26/2016.  He did very well following the surgery.    Is also felt to have renovascular hypertension.  High-grade left renal artery stenosis was angioplastied/stented on 8/12/2016.  This was successful in controlling his blood pressure.      PMH: Medications: Avapro, Crestor, Zetia, vitamins            Medical: Hypertension                          Hyperlipidemia                          Carotid stenosis    1/8/2019 laboratory: LDL= 72    Left renal ultrasound 8/13/2019 reveals no recurrent stenosis  Carotid duplex on 8/13/2019 reveals no recurrent carotid stenosis on the left CEA nor disease in the right carotid bifurcation.    Aortic ultrasound revealed a small stable infrarenal AAA measuring 3.2 x 3.3 cm.  No iliac artery aneurysms.    **Patient did not come for the scheduled appointment.  I called the patient on the phone and he thought the appointment was for tomorrow.  We will thus reschedule him for our Thursday clinic this week.      Vaibhav Yoder MD

## 2019-10-16 NOTE — PROGRESS NOTES
[place] [facility]    El Newell returns for vascular follow-up.  Is a history of multiple vascular problems including labile hypertension.  He was found to have a 90% asymptomatic stenosis of the right internal carotid artery.  He underwent a right CEA on 7/26/2016.  Very minimal left carotid bifurcation disease was noted.    PMH: Medications: Avapro, Crestor, Zetia, aspirin            Medical: Hypertension                           Hyperlipidemia--    last LDL= 72 on 1/8/2019                           Left renal artery stenosis with renovascular hypertension.                               Diagnosed by CTA in 2016.  Critical stenosis of dominant left                                   renal artery with 2 small accessory arteries and normal dual                                 arteries to the right kidney.                                    Left renal artery was angioplastied and stented                                                       successfully.                           Infrarenal AAA measuring 3.2 x 3.3 cm on 8/13/2019 1/8/2019 laboratory: LDL= 72  Non-smoker.  Lives independently.      Exam: Alert and appropriate.  Normal affect.         Blood pressure 158/98     .  Pulse 60 regular.         HEENT= normal.  Well-healed right carotid incision.           Chest= clear          Cardiovascular= regular rate          Abdomen= thin, nontender.  No palpable AAA.           Extremities= no distal edema.  Normal sensation.            +3 palpable dorsalis pedis pulses bilaterally right posterior tibial             Left posterior tibial difficult to palpate but no ischemic changes.      Carotid duplex today reveals a widely patent right CEA.  Diffuse calcification of the left common carotid artery and proximal ICA with normal velocities and no significant stenosis.      Left renal stent is widely patent.  The juxtarenal aorta measures 3.3 x 3.2 cm with the largest diameter just above the  renal arteries which is essentially unchanged.    Impression: #1.  Widely patent right CEA with calcified build minimally stenotic left carotid bifurcation.  Repeat duplex in 2 years.                          #2.   Stable juxtarenal AAA.  No need for intervention.  Repeat exam in 2 years.                        #3.  History of renovascular hypertension.  The stent is widely patent.  Pressure is elevated today but apparently has been normal in the past.  He does not check this on a regular basis at home and I have encouraged him to do so.  If this remains elevated he will be reevaluated by Dr. Dave.                          #4... Aware of the need of good risk factor control.  LDL is at goal on his statin.  Non-smoker and very active.  Somewhat elevated blood pressures described above that should be controlled.      Vaibhav Yoder MD   CC  Patient Care Team:  Lee Dave MD as PCP - General (Internal Medicine)  Lee Dave MD as Assigned PCP

## 2019-10-17 ENCOUNTER — OFFICE VISIT (OUTPATIENT)
Dept: OTHER | Facility: CLINIC | Age: 59
End: 2019-10-17
Attending: SURGERY
Payer: COMMERCIAL

## 2019-10-17 VITALS — HEART RATE: 60 BPM | SYSTOLIC BLOOD PRESSURE: 158 MMHG | DIASTOLIC BLOOD PRESSURE: 98 MMHG

## 2019-10-17 DIAGNOSIS — I15.0 RENOVASCULAR HYPERTENSION: ICD-10-CM

## 2019-10-17 DIAGNOSIS — I65.22 ASYMPTOMATIC STENOSIS OF LEFT CAROTID ARTERY: Primary | ICD-10-CM

## 2019-10-17 DIAGNOSIS — Z98.890 HISTORY OF RIGHT-SIDED CAROTID ENDARTERECTOMY: ICD-10-CM

## 2019-10-17 PROCEDURE — 99214 OFFICE O/P EST MOD 30 MIN: CPT | Mod: ZP | Performed by: SURGERY

## 2019-10-17 PROCEDURE — G0463 HOSPITAL OUTPT CLINIC VISIT: HCPCS

## 2019-10-17 NOTE — NURSING NOTE
"El Newell is a 59 year old male who presents for:  Chief Complaint   Patient presents with     RECHECK     US done 8/13/19 - History of right carotid endarterectomy and left renal stent; 1 1/2 year follow up to 12-14-17 appointment with Dr. Beba Patels:    Vitals:    10/17/19 1618   BP: (!) 158/98   BP Location: Right arm   Patient Position: Chair   Cuff Size: Adult Regular   Pulse: 60       BMI:  Estimated body mass index is 25.7 kg/m  as calculated from the following:    Height as of 9/19/18: 5' 6\" (1.676 m).    Weight as of 12/7/18: 159 lb 3.2 oz (72.2 kg).    Pain Score:  Data Unavailable        Shira Arredondo MA    "

## 2019-12-02 DIAGNOSIS — E78.2 MIXED HYPERLIPIDEMIA: ICD-10-CM

## 2019-12-02 DIAGNOSIS — I25.10 CORONARY ARTERY DISEASE INVOLVING NATIVE CORONARY ARTERY OF NATIVE HEART WITHOUT ANGINA PECTORIS: Primary | ICD-10-CM

## 2019-12-02 DIAGNOSIS — I10 ESSENTIAL HYPERTENSION, BENIGN: ICD-10-CM

## 2019-12-04 ENCOUNTER — OFFICE VISIT (OUTPATIENT)
Dept: CARDIOLOGY | Facility: CLINIC | Age: 59
End: 2019-12-04
Payer: COMMERCIAL

## 2019-12-04 VITALS
DIASTOLIC BLOOD PRESSURE: 82 MMHG | SYSTOLIC BLOOD PRESSURE: 120 MMHG | BODY MASS INDEX: 25.39 KG/M2 | HEART RATE: 60 BPM | WEIGHT: 158 LBS | HEIGHT: 66 IN

## 2019-12-04 DIAGNOSIS — I10 ESSENTIAL HYPERTENSION, BENIGN: ICD-10-CM

## 2019-12-04 DIAGNOSIS — I25.10 CORONARY ARTERY DISEASE INVOLVING NATIVE CORONARY ARTERY OF NATIVE HEART WITHOUT ANGINA PECTORIS: Primary | ICD-10-CM

## 2019-12-04 DIAGNOSIS — I25.10 CORONARY ARTERY DISEASE INVOLVING NATIVE CORONARY ARTERY OF NATIVE HEART WITHOUT ANGINA PECTORIS: ICD-10-CM

## 2019-12-04 DIAGNOSIS — E78.5 HYPERLIPIDEMIA LDL GOAL <70: ICD-10-CM

## 2019-12-04 DIAGNOSIS — E78.2 MIXED HYPERLIPIDEMIA: ICD-10-CM

## 2019-12-04 LAB
ANION GAP SERPL CALCULATED.3IONS-SCNC: 12.3 MMOL/L (ref 6–17)
BUN SERPL-MCNC: 20 MG/DL (ref 7–30)
CALCIUM SERPL-MCNC: 9.8 MG/DL (ref 8.5–10.5)
CHLORIDE SERPL-SCNC: 102 MMOL/L (ref 98–107)
CHOLEST SERPL-MCNC: 153 MG/DL
CO2 SERPL-SCNC: 28 MMOL/L (ref 23–29)
CREAT SERPL-MCNC: 1.28 MG/DL (ref 0.7–1.3)
GFR SERPL CREATININE-BSD FRML MDRD: 58 ML/MIN/{1.73_M2}
GLUCOSE SERPL-MCNC: 136 MG/DL (ref 70–105)
HDLC SERPL-MCNC: 80 MG/DL
LDLC SERPL CALC-MCNC: 56 MG/DL
NONHDLC SERPL-MCNC: 73 MG/DL
POTASSIUM SERPL-SCNC: 4.3 MMOL/L (ref 3.5–5.1)
SODIUM SERPL-SCNC: 138 MMOL/L (ref 136–145)
TRIGL SERPL-MCNC: 87 MG/DL

## 2019-12-04 PROCEDURE — 99214 OFFICE O/P EST MOD 30 MIN: CPT | Performed by: INTERNAL MEDICINE

## 2019-12-04 PROCEDURE — 80061 LIPID PANEL: CPT | Performed by: INTERNAL MEDICINE

## 2019-12-04 PROCEDURE — 36415 COLL VENOUS BLD VENIPUNCTURE: CPT | Performed by: INTERNAL MEDICINE

## 2019-12-04 PROCEDURE — 80048 BASIC METABOLIC PNL TOTAL CA: CPT | Performed by: INTERNAL MEDICINE

## 2019-12-04 RX ORDER — PROPRANOLOL HYDROCHLORIDE 20 MG/1
20 TABLET ORAL 2 TIMES DAILY
Qty: 186 TABLET | Refills: 3 | Status: SHIPPED | OUTPATIENT
Start: 2019-12-04 | End: 2020-12-29

## 2019-12-04 ASSESSMENT — MIFFLIN-ST. JEOR: SCORE: 1479.43

## 2019-12-04 NOTE — LETTER
12/4/2019    Lee Dave MD  [address] [facility] [address]    RE: El Newell       Dear Colleague,    I had the pleasure of seeing El Conde Reece in the [facility].    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     This last year he has noted he continues to be able to participate in all activities, both strength training and cardio without restriction.    His blood pressure significantly improved after renal artery stenting and in fact at one time had to cut back on his antihypertensives because he was having severe orthostasis.     He thus lists things like carvedilol and amlodipine as intolerances because of dizziness which I think is more related to this.     He notes more recently, as he takes his blood pressures regularly at home, that he is running a number of blood pressures, probably the majority, as high as 160 systolic.  He is well controlled in clinic today at 120/82.  He notes he has correlated his blood pressure cuff with the clinic cuff and it is accurate.  This is clearly concerning to him that the blood pressures are higher.  He had a recent renal ultrasound which noted widely patent renal stent without evidence of recurrent renal stenosis on the left.     He denies orthopnea, PND, edema.  No palpitations dizziness syncope or near syncope.  Denies claudication or focal neurologic symptoms.  He does note that he likes to lift weights but after each heavy weightlifting session he has  significant myalgias next day.     He initially had an abnormal CT coronary angiogram around 2016 showi ng evidence of atherosclerosis with calcified plaque in multiple vessels as well as a history of multiple coronary artery disease  risk factors.  Coronary disease risk factors are positive for cigarette smoking, discontinued in 2006, hypertension since 2013 and hyperlipidemia since 2016.    He had rather severe dyslipidemia with LDLs averaging 165-170 prior to treatment .   There is no history of documented diabetes mellitus but there was a positive family history of premature coronary artery disease in both his mother and father in their mid 50s.           He was subsequently found to have severe renal artery stenosis and underwent successful left renal artery stenting at that time.  He also had severe right carotid atherosclerosis and had successful right CEA in 2016.  A stress test at that time was able to go 15 minutes on a Dimas protocol without symptoms..  He had a normal  stress EKG, stress nuclear perfusion had a question of small inferior defect.  Recent carotid ultrasound shows patent endarterectomy on the right, no hemodynamically significant lesion on the left.     He has been on multiple drugs in the past with intolerance to ACE inhibitor, clonidine, labetalol, metoprolol, amlodipine, mostly at higher doses.  He has been intolerant to simvastatin due to myalgias.  As noted he still has some post weightlifting myalgias of concern on atorvastatin.        In December 2017 renal ultrasound showed a widely patent stent.  It incidentally reported a 3.3 cm abdominal aortic aneurysm.     Follow-up ultrasound of the abdomen in August of this year reports it as 3.2 x 3.3, no change.      PHYSICAL EXAMINATION-full exam below.  In summary:   VITAL SIGNS:  Showed a blood pressure 131/82, pulse 65 and regular, weight was 15 8 pounds, which is stable.   NECK:  Without jugular venous distention,  carotid bruit .Healed right CEA incision  CHEST:   Clear.  Normal effort  CARDIAC:  Regular rhythm, 1/6 systolic murmur at the base left sternal border and apex, appears unchanged from previous reports. No diastolic murmur, rub or S3.   EXTREMITIES:   Without cyanosis or edema.  Pulses full .    Abdomen--No abdominal bruit      Laboratory studies from today reviewed with him.  Electrolytes are normal.  Creatinine is up a bit at 1.28.  He was on a low-carb special diet yesterday and he admits he might be a little bit volume depleted.  Total cholesterol 153, LDL 56 and triglycerides 87.  This is 1 of his better profiles.  I believe his baseline LDLs were in the 160-170 range so this is excellent.     CLINICAL IMPRESSION:   1.    Coronary artery disease, no  angina at this time.  Young gentleman who we need to continue intense risk factor intervention.  Blood pressure controlled, lipids greater than 50% reduction in LDL and non-HDL at around 100.  No tobacco.  Excellent activity profile and exercise.  Normal fasting blood glucose recently.  Normal renal function     2.    Status post renal artery stenting.  Recent ultrasound showed widely patent stent     3.  History of resistant hypertension now with Hypertension, he is having regular elevated blood pressures at home which is of concern.  As noted recent ultrasound showed patent left renal arteries.  It does not appear that it checked his right.  He is to rest in further antihypertensive therapy which is a good idea if he is really getting those pressures.  He feels like he is intolerant to some of the better medications although I think it may have been partly related to overtreatment after renal artery stenting.  He has tolerated propranolol in the past and would like to try that.  Therefore he will start propranolol 20 mg twice daily.  He will keep us posted on how his blood pressures are doing and whether we need to titrate this up or add another agent such as a diuretic or retry amlodipine.  .   4.  Hyperlipidemia at reasonable goal.     Seemed to have more side effects to atorvastatin in the past.  He has tolerated rosuvastatin better with excellent results currently.  He will continue that.     5.   Peripheral vascular disease with right carotid bruit, status post carotid endarterectomy for severe right internal carotid disease.      6.  Modest abdominal aortic aneurysm-no change in size over the last 2 years.  Continues to also follow with Dr. Lopez and vascular surgery.    We will mainly focus on making sure his blood pressure is well controlled.  If her not having success during that I would suggest that we recheck to make sure he is not developing right renal artery stenosis.  He will keep us posted on his blood pressures.  At a minimum he will follow-up in a year with labs.  Sooner if he is not getting good blood pressure control    Orders Placed This Encounter   Procedures     Basic metabolic panel     Lipid Profile     Follow-Up with Cardiologist       Orders Placed This Encounter   Medications     propranolol (INDERAL) 20 MG tablet     Sig: Take 1 tablet (20 mg) by mouth 2 times daily     Dispense:  186 tablet     Refill:  3       Medications Discontinued During This Encounter   Medication Reason     fish oil-omega-3 fatty acids 1000 MG capsule Stopped by Patient     Multiple Vitamins-Minerals (MULTIVITAMIN ADULT PO) Stopped by Patient         Encounter Diagnoses   Name Primary?     Coronary artery disease involving native coronary artery of native heart without angina pectoris Yes     Essential hypertension, benign      Hyperlipidemia LDL goal <70        CURRENT MEDICATIONS:  Current Outpatient Medications   Medication Sig Dispense Refill     acetylcysteine (NAC) 500 MG CAPS capsule Take 500 mg by mouth daily       aspirin 81 MG tablet Take 81 mg by mouth daily        Coenzyme Q10 (CO Q 10) 100 MG CAPS Take by mouth daily       ezetimibe (ZETIA) 10 MG tablet Take 1 tablet (10 mg) by mouth At Bedtime 90 tablet 3     irbesartan (AVAPRO) 300 MG tablet Take 0.5 tablets (150 mg) by mouth 2 times daily 90 tablet 3     Probiotic Product (PROBIOTIC ADVANCED) CAPS Take by mouth daily       propranolol  "(INDERAL) 20 MG tablet Take 1 tablet (20 mg) by mouth 2 times daily 186 tablet 3     rosuvastatin (CRESTOR) 20 MG tablet Take 1 tablet (20 mg) by mouth daily 90 tablet 3     vitamin  B complex with vitamin C (VITAMIN  B COMPLEX) TABS Take 1 tablet by mouth daily         ALLERGIES     Allergies   Allergen Reactions     Simvastatin Muscle Pain (Myalgia)     Ace Inhibitors Cough     Carvedilol      Palpitations, syncope     Clonidine Other (See Comments)     Mental fatigue, somnolence     Labetalol Other (See Comments)     diffuse weakness, muscle cramps, stocking/glove fasiculations, urinary hesitancy, muscle cramps     Metoprolol Other (See Comments)     Severe fatigue, \"made heart jump\"     Amlodipine Other (See Comments)     Palpitations, dizziness       PAST MEDICAL HISTORY:  Past Medical History:   Diagnosis Date     AAA (abdominal aortic aneurysm) without rupture (H) 12/12/2017    AAA 3.3 cm seen on renal ultrasound     Carotid stenosis, right     s/p right CEA 7/27/16     Coronary artery disease 6-6-16    per Ca+ score = TOTAL CALCIUM SCORE: 1145.9     Gastro-oesophageal reflux disease      Hyperlipidemia LDL goal < 130 8/13/14         Hypertension 8/8/14    hospitalized with hypertensive urgency 8/13/14     Lung nodule 6-6-16    Per Ca+ score - soft tissue results. indeterminate 8 mm LUNG NODULE right lung     Near syncope      Renal artery stenosis (H)     s/p PTA & BMS to left renal artery       PAST SURGICAL HISTORY:  Past Surgical History:   Procedure Laterality Date     APPENDECTOMY       ENDARTERECTOMY CAROTID Right 7/26/2016    Procedure: ENDARTERECTOMY CAROTID;  Surgeon: Vaibhav Yoder MD;  Location: [facility] OR     FUSION SPINE POSTERIOR TWO LEVELS  2011    L5-S1 spine fusion, with instrumentation       FAMILY HISTORY:  Family History   Problem Relation Age of Onset     Hypertension Mother      C.A.D. Mother 55        MI and stent age 55     Coronary Artery Disease Mother      Myocardial " Infarction Mother      Heart Surgery Mother         stents     Hypertension Father      C.A.D. Father         MI age 55, stents age 55     Coronary Artery Disease Father      Cancer Father      Myocardial Infarction Father      Heart Surgery Father         stents     Neurologic Disorder Sister 30        progressive supranuclear palsy     Hypertension Sister      Hypertension Sister      Heart Surgery Sister         stent     Hypertension Brother      Coronary Artery Disease Maternal Grandmother      Hypertension Maternal Grandmother      Heart Surgery Maternal Grandmother          during surgery     Cancer Maternal Grandfather      Leukemia Maternal Grandfather      Hypertension Paternal Grandmother      Other - See Comments Paternal Grandmother         embolism     Heart Disease Paternal Grandfather        SOCIAL HISTORY:  Social History     Socioeconomic History     Marital status:      Spouse name: None     Number of children: None     Years of education: None     Highest education level: None   Occupational History     Occupation: psychiatrist     Employer: SELF EMPLOYED.   Social Needs     Financial resource strain: None     Food insecurity:     Worry: None     Inability: None     Transportation needs:     Medical: None     Non-medical: None   Tobacco Use     Smoking status: Former Smoker     Packs/day: 1.00     Years: 27.00     Pack years: 27.00     Start date:      Last attempt to quit: 2004     Years since quitting: 15.3     Smokeless tobacco: Never Used   Substance and Sexual Activity     Alcohol use: Yes     Alcohol/week: 0.0 standard drinks     Comment: 1-2 drinks day, 4 days week, scotch     Drug use: No     Sexual activity: Never   Lifestyle     Physical activity:     Days per week: None     Minutes per session: None     Stress: None   Relationships     Social connections:     Talks on phone: None     Gets together: None     Attends [employer] service: None     Active member of club  "or organization: None     Attends meetings of clubs or organizations: None     Relationship status: None     Intimate partner violence:     Fear of current or ex partner: None     Emotionally abused: None     Physically abused: None     Forced sexual activity: None   Other Topics Concern      Service Not Asked     Blood Transfusions Not Asked     Caffeine Concern Yes     Comment: 2 cups of coffee a day     Occupational Exposure Not Asked     Hobby Hazards Not Asked     Sleep Concern No     Stress Concern No     Weight Concern No     Special Diet Yes     Comment: vegetarian diet     Back Care Not Asked     Exercise Yes     Comment: biking,  walking x5     Bike Helmet No     Seat Belt Yes     Self-Exams Not Asked     Parent/sibling w/ CABG, MI or angioplasty before 65F 55M? Yes   Social History Narrative     None       Review of Systems:  Skin:  Negative       Eyes:  Negative      ENT:  Positive for tinnitus;hearing loss wears hearing aides  Respiratory:  Negative       Cardiovascular:  Negative      Gastroenterology: Negative      Genitourinary:  Negative      Musculoskeletal:  Negative      Neurologic:  Negative      Psychiatric:  Negative      Heme/Lymph/Imm:  Positive for allergies seasonal  Endocrine:  Negative        Physical Exam:  Vitals: /82   Pulse 60   Ht 1.676 m (5' 6\")   Wt 71.7 kg (158 lb)   BMI 25.50 kg/m       Constitutional:  cooperative, alert and oriented, well developed, well nourished, in no acute distress        Skin:  warm and dry to the touch;no apparent skin lesions or masses noted surgical scars well-healed        Head:  normocephalic, no masses or lesions        Eyes:  pupils equal and round;sclera white;EOMS intact        Lymph:      ENT:  no pallor or cyanosis        Neck:  carotid pulses are full and equal bilaterally;JVP normal;no carotid bruit(Healef R CEA incision) transmitted murmur      Respiratory:  normal breath sounds, clear to auscultation, normal A-P diameter, " normal symmetry, normal respiratory excursion, no use of accessory muscles         Cardiac: regular rhythm;normal S1 and S2;apical impulse not displaced       systolic murmur;LUSB;grade 1;LLSB        pulses full and equal                                        GI:  abdomen soft;BS normoactive;non-tender;no bruits        Extremities and Muscular Skeletal:  no deformities, clubbing, cyanosis, erythema observed;no edema              Neurological:  no gross motor deficits        Psych:  affect appropriate, oriented to time, person and place            Thank you for allowing me to participate in the care of your patient.    Sincerely,     Montana Thompson MD     [facility]

## 2019-12-06 DIAGNOSIS — E78.5 HYPERLIPIDEMIA LDL GOAL <70: ICD-10-CM

## 2019-12-06 RX ORDER — EZETIMIBE 10 MG/1
10 TABLET ORAL AT BEDTIME
Qty: 90 TABLET | Refills: 3 | Status: SHIPPED | OUTPATIENT
Start: 2019-12-06 | End: 2021-01-20

## 2019-12-19 DIAGNOSIS — E78.5 HYPERLIPIDEMIA WITH TARGET LDL LESS THAN 130: ICD-10-CM

## 2019-12-19 RX ORDER — IRBESARTAN 300 MG/1
150 TABLET ORAL 2 TIMES DAILY
Qty: 90 TABLET | Refills: 3 | Status: SHIPPED | OUTPATIENT
Start: 2019-12-19 | End: 2021-01-20

## 2020-01-01 NOTE — NURSING NOTE
"Chief Complaint   Patient presents with     Back Pain     URI       Initial /78  Pulse 60  Temp 97.9  F (36.6  C) (Oral)  Wt 160 lb 9.6 oz (72.8 kg)  SpO2 97%  BMI 25.92 kg/m2 Estimated body mass index is 25.92 kg/(m^2) as calculated from the following:    Height as of 9/27/17: 5' 6\" (1.676 m).    Weight as of this encounter: 160 lb 9.6 oz (72.8 kg).  Medication Reconciliation: complete   .Gabrielle GARCIA      "
 used

## 2020-03-02 ENCOUNTER — OFFICE VISIT (OUTPATIENT)
Dept: INTERNAL MEDICINE | Facility: CLINIC | Age: 60
End: 2020-03-02
Payer: COMMERCIAL

## 2020-03-02 VITALS
OXYGEN SATURATION: 98 % | BODY MASS INDEX: 25.17 KG/M2 | DIASTOLIC BLOOD PRESSURE: 82 MMHG | HEIGHT: 66 IN | WEIGHT: 156.6 LBS | HEART RATE: 71 BPM | SYSTOLIC BLOOD PRESSURE: 128 MMHG | TEMPERATURE: 98 F | RESPIRATION RATE: 15 BRPM

## 2020-03-02 DIAGNOSIS — S42.101D CLOSED FRACTURE OF RIGHT SCAPULA WITH ROUTINE HEALING, UNSPECIFIED PART OF SCAPULA, SUBSEQUENT ENCOUNTER: ICD-10-CM

## 2020-03-02 DIAGNOSIS — S22.41XD CLOSED FRACTURE OF MULTIPLE RIBS OF RIGHT SIDE WITH ROUTINE HEALING, SUBSEQUENT ENCOUNTER: ICD-10-CM

## 2020-03-02 DIAGNOSIS — S06.5XAA SUBDURAL HEMATOMA (H): Primary | ICD-10-CM

## 2020-03-02 DIAGNOSIS — S06.9X0D TRAUMATIC BRAIN INJURY, WITHOUT LOSS OF CONSCIOUSNESS, SUBSEQUENT ENCOUNTER: ICD-10-CM

## 2020-03-02 PROCEDURE — 99214 OFFICE O/P EST MOD 30 MIN: CPT | Performed by: INTERNAL MEDICINE

## 2020-03-02 RX ORDER — POLYETHYLENE GLYCOL 3350 17 G/17G
17 POWDER, FOR SOLUTION ORAL DAILY
COMMUNITY
Start: 2020-02-27 | End: 2021-04-21

## 2020-03-02 RX ORDER — OXYCODONE HYDROCHLORIDE 5 MG/1
5-10 TABLET ORAL EVERY 4 HOURS PRN
COMMUNITY
Start: 2020-02-26 | End: 2020-03-04

## 2020-03-02 RX ORDER — ACETAMINOPHEN 325 MG/1
650 TABLET ORAL EVERY 6 HOURS PRN
COMMUNITY
Start: 2020-02-26

## 2020-03-02 RX ORDER — SENNA AND DOCUSATE SODIUM 50; 8.6 MG/1; MG/1
1 TABLET, FILM COATED ORAL 2 TIMES DAILY PRN
COMMUNITY
Start: 2020-02-26 | End: 2021-04-21

## 2020-03-02 ASSESSMENT — MIFFLIN-ST. JEOR: SCORE: 1473.08

## 2020-03-02 NOTE — PROGRESS NOTES
Subjective     El Newell is a 59 year old male who presents to clinic today for the following health issues:    Patient is seen today in the presence of his wife.    [facility]     Hospital Follow-up Visit:    [place] [facility]  [place] of Admission: 02/24/20  Date of Discharge: 02/27/20  Reason(s) for Admission: subdural hematoma             Problems taking medications regularly:  None       Medication changes since discharge: None       Problems adhering to non-medication therapy:  None    Summary of hospitalization:  [facility] [place]  Diagnostic Tests/Treatments reviewed.  Follow up needed: none  Other Healthcare Providers Involved in Patient s Care:         None  Update since discharge: improved.     Post Discharge Medication Reconciliation: discharge medications reconciled, continue medications without change.  Plan of care communicated with patient     Coding guidelines for this visit:  Type of Medical   Decision Making Face-to-Face Visit       within 7 Days of discharge Face-to-Face Visit        within 14 days of discharge   Moderate Complexity [id number]   High Complexity [id number]       Current known injuries:   - T9 superior end plate fracture  - Intraparenchymal hemorrhage in the right parietal and occipital lobes and in the posterior right supraorbital frontal lobe  - Relatively small subdural hemorrhage   - Subarachnoid hemorrhage along the inferior left temporal lobe   - Right posterior scalp hematoma  - Right 4th-8th rib fractures with associated hemopneumothorax   - Right scapular fracture  - Right flank subcutaneous hematoma      Since home from hospital, they report that they are feeling somewhat better.   Energy level and stamina are slowly improving.    Appetite and intake are improving.   No fevers, no chills  No shortness of breath.   No abdominal pain.   They have not returned to prior routine and activities.                    Reviewed and  "updated as needed this visit by Provider         Review of Systems         Objective    /82   Pulse 71   Temp 98  F (36.7  C) (Oral)   Resp 15   Ht 1.676 m (5' 6\")   Wt 71 kg (156 lb 9.6 oz)   SpO2 98%   BMI 25.28 kg/m    Body mass index is 25.28 kg/m .  Physical Exam                 Past Medical History:  ---------------------------  Past Medical History:   Diagnosis Date     AAA (abdominal aortic aneurysm) without rupture (H) 12/12/2017    AAA 3.3 cm seen on renal ultrasound     Carotid stenosis, right     s/p right CEA 7/27/16     Coronary artery disease 6-6-16    per Ca+ score = TOTAL CALCIUM SCORE: 1145.9     Gastro-oesophageal reflux disease      Hyperlipidemia LDL goal < 130 8/13/14         Hypertension 8/8/14    hospitalized with hypertensive urgency 8/13/14     Lung nodule 6-6-16    Per Ca+ score - soft tissue results. indeterminate 8 mm LUNG NODULE right lung     Near syncope      Renal artery stenosis (H)     s/p PTA & BMS to left renal artery       Past Surgical History:  ---------------------------  Past Surgical History:   Procedure Laterality Date     APPENDECTOMY       ENDARTERECTOMY CAROTID Right 7/26/2016    Procedure: ENDARTERECTOMY CAROTID;  Surgeon: Vaibhav Yoder MD;  Location: [facility] OR     FUSION SPINE POSTERIOR TWO LEVELS  2011    L5-S1 spine fusion, with instrumentation       Current Medications:  ---------------------------  Current Outpatient Medications   Medication Sig Dispense Refill     acetaminophen (TYLENOL) 325 MG tablet Take 650 mg by mouth every 6 hours as needed       acetylcysteine (NAC) 500 MG CAPS capsule Take 500 mg by mouth daily       aspirin 81 MG tablet Take 81 mg by mouth daily        Coenzyme Q10 (CO Q 10) 100 MG CAPS Take by mouth daily       ezetimibe (ZETIA) 10 MG tablet Take 1 tablet (10 mg) by mouth At Bedtime 90 tablet 3     irbesartan (AVAPRO) 300 MG tablet Take 0.5 tablets (150 mg) by mouth 2 times daily 90 tablet 3     oxyCODONE " "(ROXICODONE) 5 MG tablet Take 5-10 mg by mouth every 4 hours as needed       polyethylene glycol (MIRALAX) powder Take 17 g by mouth daily       Probiotic Product (PROBIOTIC ADVANCED) CAPS Take by mouth daily       propranolol (INDERAL) 20 MG tablet Take 1 tablet (20 mg) by mouth 2 times daily 186 tablet 3     rosuvastatin (CRESTOR) 20 MG tablet Take 1 tablet (20 mg) by mouth daily 90 tablet 3     SENNA-docusate sodium (SENNA S) 8.6-50 MG tablet Take 1 tablet by mouth 2 times daily as needed       vitamin  B complex with vitamin C (VITAMIN  B COMPLEX) TABS Take 1 tablet by mouth daily         Allergies:  -------------  Allergies   Allergen Reactions     Simvastatin Muscle Pain (Myalgia)     Ace Inhibitors Cough     Carvedilol      Palpitations, syncope     Clonidine Other (See Comments)     Mental fatigue, somnolence     Labetalol Other (See Comments)     diffuse weakness, muscle cramps, stocking/glove fasiculations, urinary hesitancy, muscle cramps     Metoprolol Other (See Comments)     Severe fatigue, \"made heart jump\"     Amlodipine Other (See Comments)     Palpitations, dizziness       Social History:  -------------------  Social History     Socioeconomic History     Marital status:      Spouse name: Not on file     Number of children: Not on file     Years of education: Not on file     Highest education level: Not on file   Occupational History     Occupation: psychiatrist     Employer: SELF EMPLOYED.   Social Needs     Financial resource strain: Not on file     Food insecurity:     Worry: Not on file     Inability: Not on file     Transportation needs:     Medical: Not on file     Non-medical: Not on file   Tobacco Use     Smoking status: Former Smoker     Packs/day: 1.00     Years: 27.00     Pack years: 27.00     Start date: 1977     Last attempt to quit: 8/18/2004     Years since quitting: 15.5     Smokeless tobacco: Never Used   Substance and Sexual Activity     Alcohol use: Yes     Alcohol/week: " 0.0 standard drinks     Comment: 1-2 drinks day, 4 days week, scotch     Drug use: No     Sexual activity: Never   Lifestyle     Physical activity:     Days per week: Not on file     Minutes per session: Not on file     Stress: Not on file   Relationships     Social connections:     Talks on phone: Not on file     Gets together: Not on file     Attends Worship service: Not on file     Active member of club or organization: Not on file     Attends meetings of clubs or organizations: Not on file     Relationship status: Not on file     Intimate partner violence:     Fear of current or ex partner: Not on file     Emotionally abused: Not on file     Physically abused: Not on file     Forced sexual activity: Not on file   Other Topics Concern      Service Not Asked     Blood Transfusions Not Asked     Caffeine Concern Yes     Comment: 2 cups of coffee a day     Occupational Exposure Not Asked     Hobby Hazards Not Asked     Sleep Concern No     Stress Concern No     Weight Concern No     Special Diet Yes     Comment: vegetarian diet     Back Care Not Asked     Exercise Yes     Comment: biking,  walking x5     Bike Helmet No     Seat Belt Yes     Self-Exams Not Asked     Parent/sibling w/ CABG, MI or angioplasty before 65F 55M? Yes   Social History Narrative     Not on file       Family Medical History:  ------------------------------  Family History   Problem Relation Age of Onset     Hypertension Mother      C.A.D. Mother 55        MI and stent age 55     Coronary Artery Disease Mother      Myocardial Infarction Mother      Heart Surgery Mother         stents     Hypertension Father      C.A.D. Father         MI age 55, stents age 55     Coronary Artery Disease Father      Cancer Father      Myocardial Infarction Father      Heart Surgery Father         stents     Neurologic Disorder Sister 30        progressive supranuclear palsy     Hypertension Sister      Hypertension Sister      Heart Surgery Sister        "  stent     Hypertension Brother      Coronary Artery Disease Maternal Grandmother      Hypertension Maternal Grandmother      Heart Surgery Maternal Grandmother          during surgery     Cancer Maternal Grandfather      Leukemia Maternal Grandfather      Hypertension Paternal Grandmother      Other - See Comments Paternal Grandmother         embolism     Heart Disease Paternal Grandfather          ROS:  REVIEW OF SYSTEMS:    RESP: negative for cough, dyspnea, wheezing, hemoptysis  CV: negative for chest pain, palpitations, PND, [place], orthopnea  GI: negative for dysphagia, N/V, pain, melena, diarrhea and constipation  NEURO: negative for new numbness/tingling, paralysis, incoordination, or focal weakness     OBJECTIVE:                                                    /82   Pulse 71   Temp 98  F (36.7  C) (Oral)   Resp 15   Ht 1.676 m (5' 6\")   Wt 71 kg (156 lb 9.6 oz)   SpO2 98%   BMI 25.28 kg/m       GENERAL alert and no distress  EYES: Bilateral scleral hemorrhages (resolving), EOMI,   HENT: oral and posterior pharynx without lesions or erythema, facies symmetric, scalp hematoma smaller, no Kemp's signs, no hemotympanum  NECK: Neck supple. No LAD, without thyroidmegaly.  RESP: Clear to ausculation bilaterally without wheezes or crackles. Normal BS in all fields.  CHEST: Extensive ecchymoses in his right flank, right chest wall, right scapular region.  CV: RRR normal S1S2 without murmurs, rubs or gallops.  LYMPH: no cervical lymph adenopathy appreciated  Tenderness in right lateral thoracic rib cage, pain to palpation right scapula.  EXT:  no lower extremity edema  PSYCH: Alert and oriented times 3; speech- coherent           ASSESSMENT/PLAN:                                                      (S06.5X9A) Subdural hematoma (H)  (primary encounter diagnosis)  Comment: Sustained mild subdural hematoma as result of the fall.  Seems to be healing appropriately, no new neurological " symptoms.  Follow-up plan with a traumatic brain injury clinic at [facility], most likely will receive follow-up head CT scan at that time.  Plan:     (S22.41XD) Closed fracture of multiple ribs of right side with routine healing, subsequent encounter  Comment: Discussed the nature of rib fractures.  Treat pain as ordered.  Usually NSAIDs will work fine, but pain is severe, then will use narcotics for a brief time.   Discussed the expected timeline of recovery being usually 1-2 weeks for acute pain, then another 3-4 weeks for subacute pain.    Discussed the importance of keeping the lungs inflated as to prevent the chance of PNE developing, and reviewed the signs and symptoms of PNE.   Recommended using a pillow to splint the chest wall while coughing or deep breathing.   Return if any changes or worsening of symptoms.    Plan:       (S06.9X0D) Traumatic brain injury, without loss of consciousness, subsequent encounter  Comment: Discussed concussions and the .TBI/postconcussion syndrome and its possilbe sx. including ongon headaches, mild short term memory loss, mild personality changes.  discussed warning sign sof more significant head and brain injuries.  DIscussed expected course of improvement.    Patient should have a repeat head CT as mentioned above to follow-up and confirm resolution of the previous subdural hematoma.  Patient will follow-up with the TBI clinic at [facility].  This point patient feels he is doing well.  Plan:     (S42.101D) Closed fracture of right scapula with routine healing, unspecified part of scapula, subsequent encounter  Comment: Expect this to heal with time.  Patient has oxycodone he can use as needed for pain relief.  Use with caution.  Plan:      See Patient Instructions    CARMELINA KOEHLER M.D., MD  [facility]    I spent greater than 25 minutes with pt and his wife, greater than 50% of time was educational and counseling.     (Chart documentation may have been completed,  in part, with Dragon voice-recognition software. Even though reviewed, some grammatical, spelling, and word errors may remain.)

## 2020-03-02 NOTE — PATIENT INSTRUCTIONS
*  Continue all medications at the same doses.  Contact your usual pharmacy if you need refills.     *  Follow up as planned at [facility] TBI clinic and orthopedic clinics.      *  [facility], sooner if needed.  Use [employer] or Call [phone] to schedule this appointment.

## 2020-03-10 ENCOUNTER — MYC MEDICAL ADVICE (OUTPATIENT)
Dept: INTERNAL MEDICINE | Facility: CLINIC | Age: 60
End: 2020-03-10

## 2020-03-16 ENCOUNTER — MYC MEDICAL ADVICE (OUTPATIENT)
Dept: INTERNAL MEDICINE | Facility: CLINIC | Age: 60
End: 2020-03-16
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
## 2020-03-17 NOTE — TELEPHONE ENCOUNTER
PCP please advise of letter request. Would you like a telephone visit/ E-visit for a follow up? Dx TBI    Monisha MARTIN, RN, PHN

## 2020-04-24 ENCOUNTER — NURSE TRIAGE (OUTPATIENT)
Dept: NURSING | Facility: CLINIC | Age: 60
End: 2020-04-24

## 2020-04-24 NOTE — TELEPHONE ENCOUNTER
Patient calling about anitbody testing. Believes he  Had it in March and would like to ne tested and donate if he is.  Phone number given .  Francisca Atwood RN    Reason for Disposition    General information question, no triage required and triager able to answer question    Additional Information    Negative: [1] Caller is not with the adult (patient) AND [2] reporting urgent symptoms    Negative: Lab result questions    Negative: Medication questions    Negative: Caller can't be reached by phone    Negative: Caller has already spoken to PCP or another triager    Negative: RN needs further essential information from caller in order to complete triage    Negative: Requesting regular office appointment    Negative: [1] Caller requesting NON-URGENT health information AND [2] PCP's office is the best resource    Protocols used: INFORMATION ONLY CALL-A-

## 2020-10-20 DIAGNOSIS — E78.2 MIXED HYPERLIPIDEMIA: ICD-10-CM

## 2020-10-20 DIAGNOSIS — I25.10 CORONARY ARTERY DISEASE INVOLVING NATIVE CORONARY ARTERY OF NATIVE HEART WITHOUT ANGINA PECTORIS: ICD-10-CM

## 2020-10-20 RX ORDER — ROSUVASTATIN CALCIUM 20 MG/1
20 TABLET, COATED ORAL DAILY
Qty: 90 TABLET | Refills: 0 | Status: SHIPPED | OUTPATIENT
Start: 2020-10-20 | End: 2021-01-19

## 2020-12-29 DIAGNOSIS — I10 ESSENTIAL HYPERTENSION, BENIGN: ICD-10-CM

## 2020-12-29 RX ORDER — PROPRANOLOL HYDROCHLORIDE 20 MG/1
20 TABLET ORAL 2 TIMES DAILY
Qty: 180 TABLET | Refills: 0 | Status: SHIPPED | OUTPATIENT
Start: 2020-12-29 | End: 2021-04-21

## 2021-01-14 ENCOUNTER — HEALTH MAINTENANCE LETTER (OUTPATIENT)
Age: 61
End: 2021-01-14

## 2021-01-19 DIAGNOSIS — E78.2 MIXED HYPERLIPIDEMIA: ICD-10-CM

## 2021-01-19 DIAGNOSIS — I25.10 CORONARY ARTERY DISEASE INVOLVING NATIVE CORONARY ARTERY OF NATIVE HEART WITHOUT ANGINA PECTORIS: ICD-10-CM

## 2021-01-19 RX ORDER — ROSUVASTATIN CALCIUM 20 MG/1
20 TABLET, COATED ORAL DAILY
Qty: 90 TABLET | Refills: 0 | Status: SHIPPED | OUTPATIENT
Start: 2021-01-19 | End: 2021-04-19

## 2021-01-20 DIAGNOSIS — E78.5 HYPERLIPIDEMIA WITH TARGET LDL LESS THAN 130: ICD-10-CM

## 2021-01-20 DIAGNOSIS — E78.5 HYPERLIPIDEMIA LDL GOAL <70: ICD-10-CM

## 2021-01-20 RX ORDER — EZETIMIBE 10 MG/1
10 TABLET ORAL AT BEDTIME
Qty: 90 TABLET | Refills: 0 | Status: SHIPPED | OUTPATIENT
Start: 2021-01-20 | End: 2021-04-19

## 2021-01-20 RX ORDER — IRBESARTAN 300 MG/1
150 TABLET ORAL 2 TIMES DAILY
Qty: 90 TABLET | Refills: 0 | Status: SHIPPED | OUTPATIENT
Start: 2021-01-20 | End: 2021-04-19

## 2021-04-19 DIAGNOSIS — E78.5 HYPERLIPIDEMIA LDL GOAL <70: ICD-10-CM

## 2021-04-19 DIAGNOSIS — I25.10 CORONARY ARTERY DISEASE INVOLVING NATIVE CORONARY ARTERY OF NATIVE HEART WITHOUT ANGINA PECTORIS: ICD-10-CM

## 2021-04-19 DIAGNOSIS — E78.2 MIXED HYPERLIPIDEMIA: ICD-10-CM

## 2021-04-19 DIAGNOSIS — E78.5 HYPERLIPIDEMIA WITH TARGET LDL LESS THAN 130: ICD-10-CM

## 2021-04-19 RX ORDER — EZETIMIBE 10 MG/1
10 TABLET ORAL AT BEDTIME
Qty: 90 TABLET | Refills: 0 | Status: SHIPPED | OUTPATIENT
Start: 2021-04-19 | End: 2021-04-21

## 2021-04-19 RX ORDER — IRBESARTAN 300 MG/1
150 TABLET ORAL 2 TIMES DAILY
Qty: 90 TABLET | Refills: 0 | Status: SHIPPED | OUTPATIENT
Start: 2021-04-19 | End: 2021-04-21

## 2021-04-19 RX ORDER — ROSUVASTATIN CALCIUM 20 MG/1
20 TABLET, COATED ORAL DAILY
Qty: 90 TABLET | Refills: 0 | Status: SHIPPED | OUTPATIENT
Start: 2021-04-19 | End: 2021-04-21

## 2021-04-20 DIAGNOSIS — I10 ESSENTIAL HYPERTENSION, BENIGN: ICD-10-CM

## 2021-04-20 DIAGNOSIS — I25.10 CORONARY ARTERY DISEASE INVOLVING NATIVE CORONARY ARTERY OF NATIVE HEART WITHOUT ANGINA PECTORIS: ICD-10-CM

## 2021-04-20 DIAGNOSIS — E78.5 HYPERLIPIDEMIA LDL GOAL <70: ICD-10-CM

## 2021-04-20 LAB
ANION GAP SERPL CALCULATED.3IONS-SCNC: 7 MMOL/L (ref 3–14)
BUN SERPL-MCNC: 17 MG/DL (ref 7–30)
CALCIUM SERPL-MCNC: 9.3 MG/DL (ref 8.5–10.1)
CHLORIDE SERPL-SCNC: 104 MMOL/L (ref 94–109)
CHOLEST SERPL-MCNC: 166 MG/DL
CO2 SERPL-SCNC: 25 MMOL/L (ref 20–32)
CREAT SERPL-MCNC: 1.18 MG/DL (ref 0.66–1.25)
GFR SERPL CREATININE-BSD FRML MDRD: 67 ML/MIN/{1.73_M2}
GLUCOSE SERPL-MCNC: 114 MG/DL (ref 70–99)
HDLC SERPL-MCNC: 75 MG/DL
LDLC SERPL CALC-MCNC: 67 MG/DL
NONHDLC SERPL-MCNC: 91 MG/DL
POTASSIUM SERPL-SCNC: 4.1 MMOL/L (ref 3.4–5.3)
SODIUM SERPL-SCNC: 136 MMOL/L (ref 133–144)
TRIGL SERPL-MCNC: 121 MG/DL

## 2021-04-20 PROCEDURE — 80061 LIPID PANEL: CPT | Performed by: INTERNAL MEDICINE

## 2021-04-20 PROCEDURE — 80048 BASIC METABOLIC PNL TOTAL CA: CPT | Performed by: INTERNAL MEDICINE

## 2021-04-20 PROCEDURE — 36415 COLL VENOUS BLD VENIPUNCTURE: CPT | Performed by: INTERNAL MEDICINE

## 2021-04-21 ENCOUNTER — OFFICE VISIT (OUTPATIENT)
Dept: CARDIOLOGY | Facility: CLINIC | Age: 61
End: 2021-04-21
Payer: COMMERCIAL

## 2021-04-21 VITALS
SYSTOLIC BLOOD PRESSURE: 122 MMHG | DIASTOLIC BLOOD PRESSURE: 78 MMHG | WEIGHT: 158.3 LBS | OXYGEN SATURATION: 97 % | HEIGHT: 66 IN | BODY MASS INDEX: 25.44 KG/M2 | HEART RATE: 69 BPM

## 2021-04-21 DIAGNOSIS — E78.2 MIXED HYPERLIPIDEMIA: ICD-10-CM

## 2021-04-21 DIAGNOSIS — I65.23 BILATERAL CAROTID ARTERY STENOSIS: ICD-10-CM

## 2021-04-21 DIAGNOSIS — I25.10 CORONARY ARTERY DISEASE INVOLVING NATIVE CORONARY ARTERY OF NATIVE HEART WITHOUT ANGINA PECTORIS: Primary | ICD-10-CM

## 2021-04-21 DIAGNOSIS — E78.5 HYPERLIPIDEMIA LDL GOAL <70: ICD-10-CM

## 2021-04-21 DIAGNOSIS — E78.5 HYPERLIPIDEMIA WITH TARGET LDL LESS THAN 130: ICD-10-CM

## 2021-04-21 DIAGNOSIS — I71.40 AAA (ABDOMINAL AORTIC ANEURYSM) WITHOUT RUPTURE (H): ICD-10-CM

## 2021-04-21 DIAGNOSIS — I10 ESSENTIAL HYPERTENSION, BENIGN: ICD-10-CM

## 2021-04-21 PROCEDURE — 99214 OFFICE O/P EST MOD 30 MIN: CPT | Performed by: INTERNAL MEDICINE

## 2021-04-21 RX ORDER — PROPRANOLOL HYDROCHLORIDE 10 MG/1
10 TABLET ORAL 2 TIMES DAILY
Qty: 180 TABLET | Refills: 3 | Status: SHIPPED | OUTPATIENT
Start: 2021-04-21 | End: 2022-05-27

## 2021-04-21 RX ORDER — VIT C/B6/B5/MAGNESIUM/HERB 173 50-5-6-5MG
CAPSULE ORAL DAILY
COMMUNITY

## 2021-04-21 RX ORDER — EZETIMIBE 10 MG/1
10 TABLET ORAL AT BEDTIME
Qty: 90 TABLET | Refills: 3 | Status: SHIPPED | OUTPATIENT
Start: 2021-04-21 | End: 2021-10-05

## 2021-04-21 RX ORDER — ROSUVASTATIN CALCIUM 20 MG/1
20 TABLET, COATED ORAL DAILY
Qty: 90 TABLET | Refills: 3 | Status: SHIPPED | OUTPATIENT
Start: 2021-04-21 | End: 2022-06-22

## 2021-04-21 RX ORDER — IRBESARTAN 75 MG/1
75 TABLET ORAL 2 TIMES DAILY
Qty: 180 TABLET | Refills: 3 | Status: SHIPPED | OUTPATIENT
Start: 2021-04-21 | End: 2021-06-30

## 2021-04-21 RX ORDER — ASCORBIC ACID 125 MG
TABLET,CHEWABLE ORAL DAILY
COMMUNITY

## 2021-04-21 ASSESSMENT — MIFFLIN-ST. JEOR: SCORE: 1475.79

## 2021-04-21 NOTE — PROGRESS NOTES
CARDIOLOGY CLINIC FOLLOW-UP NOTE      REASON FOR VISIT:   Follow-up CAD, renal artery stenosis s/p stenting    PRIMARY CARE PHYSICIAN:  Lee Dave        History of Present Illness   El Newell is an extremely pleasant 60 year old male, previously a patient of Dr. Thompson who was last seen on 12/4/2019, here for routine follow-up.  Briefly, he has a history of abnormal coronary CTA around 2016 showing evidence of calcified plaque in multiple vessels.  This was then followed by an exercise MPI where he exercised for 15 minutes and achieved 17 METS without developing any symptoms and with normal perfusion at rest and stress, so he did not go for invasive coronary angiography.  He also has dealt with resistant hypertension and underwent successful stenting of the left renal artery by Dr. Faulkner in 2016, severe right carotid atherosclerosis s/p successful right CEA in 2016, hyperlipidemia, and former tobacco abuse.    Since his last visit, El has been doing very well.  He reports no cardiac issues.  He exercises frequently including heavy weightlifting and cardio with no chest pain, shortness of breath, or other symptoms.  He did cut down on his propranolol dose from 20 mg twice daily to 10 mg twice daily due to some orthostatic symptoms, but these have resolved at the lower dose.  His blood pressure today is 122/78, and he reports his blood pressure is always well controlled recently.    His most recent labs are from 4/20/2021, and show normal electrolytes, creatinine of 1.18 with estimated GFR of 67, total cholesterol of 166, HDL 75, LDL of 67, and triglycerides of 121.  His most recent carotid ultrasound is from 8/13/2019, and shows less than 50% relative stenosis of the ICA bilaterally, with extensive calcified and noncalcified plaque of the left carotid but not significantly changed from prior.  His abdominal aortic ultrasound from 8/13/2019 shows aneurysmal dilation of the infrarenal abdominal  aorta up to 3.2 x 3.3 cm, which was stable from prior.  Finally, his left renal duplex from 8/13/2019 shows a widely patent left renal artery stent.        Assessment & Plan     1. Asymptomatic coronary artery disease by CT scan  2. Carotid artery disease s/p right CEA in 2016  3. Resistant hypertension, much improved following left renal artery stenting in 2016  4. Mild infrarenal abdominal aortic aneurysm (3.2 x 3.3 cm in 2019)  5. Hyperlipidemia  6. Former tobacco abuse      Overall, El continues to do very well.  We will plan to continue his current medications, including the lower dose propranolol that he has self adjusted to.  His blood pressure is very well controlled at present.  I encouraged him to continue with his excellent diet and exercise regimen.  We will also repeat his screening abdominal aortic ultrasound and bilateral carotid duplex.  We will plan to see him in 1 year unless new issues arise in the meantime.      -Carotid duplex ultrasound  -Abdominal aortic ultrasound  -Continue aspirin 81 mg daily  -Continue Crestor 20 mg daily and Zetia 10 mg daily  -Continue irbesartan 75 mg twice daily and propranolol 10 mg twice daily      Follow-up: 1 year, with lipids every 2 years        Apolinar Hernández MD  Interventional Cardiology  April 21, 2021        Medications   Current Outpatient Medications   Medication     acetaminophen (TYLENOL) 325 MG tablet     aspirin 81 MG tablet     Coenzyme Q10 (CO Q 10) 100 MG CAPS     ezetimibe (ZETIA) 10 MG tablet     irbesartan (AVAPRO) 75 MG tablet     Menaquinone-7 (VITAMIN K2) 100 MCG CAPS     propranolol (INDERAL) 10 MG tablet     rosuvastatin (CRESTOR) 20 MG tablet     Turmeric (CURCUMIN 95) 500 MG CAPS     UNABLE TO FIND     vitamin  B complex with vitamin C (VITAMIN  B COMPLEX) TABS     No current facility-administered medications for this visit.      Allergies   Allergies   Allergen Reactions     Simvastatin Muscle Pain (Myalgia)     Ace Inhibitors Cough  "    Carvedilol      Palpitations, syncope     Clonidine Other (See Comments)     Mental fatigue, somnolence     Labetalol Other (See Comments)     diffuse weakness, muscle cramps, stocking/glove fasiculations, urinary hesitancy, muscle cramps     Metoprolol Other (See Comments)     Severe fatigue, \"made heart jump\"     Amlodipine Other (See Comments)     Palpitations, dizziness         Physical Exam       BP: 122/78 Pulse: 69     SpO2: 97 %      Vital Signs with Ranges  Pulse:  [69] 69  BP: (122)/(78) 122/78  SpO2:  [97 %] 97 %  158 lbs 4.8 oz    Constitutional: Well-appearing, no acute distress  Respiratory: Normal respiratory effort, CTAB  Cardiovascular: RRR, no m/r/g.  JVP < 7 cm H2O.  There is no LE edema.  Normal carotid upstrokes, no carotid bruits.                  "

## 2021-04-21 NOTE — LETTER
4/21/2021    Lee Dave MD  [address] [facility] [address]    RE: El Newell       Dear Colleague,    I had the pleasure of seeing El Newell in the [facility].    CARDIOLOGY CLINIC FOLLOW-UP NOTE      REASON FOR VISIT:   Follow-up CAD, renal artery stenosis s/p stenting    PRIMARY CARE PHYSICIAN:  Lee Dave        History of Present Illness   El Newell is an extremely pleasant 60 year old male, previously a patient of Dr. Thompson who was last seen on 12/4/2019, here for routine follow-up.  Briefly, he has a history of abnormal coronary CTA around 2016 showing evidence of calcified plaque in multiple vessels.  This was then followed by an exercise MPI where he exercised for 15 minutes and achieved 17 METS without developing any symptoms and with normal perfusion at rest and stress, so he did not go for invasive coronary angiography.  He also has dealt with resistant hypertension and underwent successful stenting of the left renal artery by Dr. Faulkner in 2016, severe right carotid atherosclerosis s/p successful right CEA in 2016, hyperlipidemia, and former tobacco abuse.    Since his last visit, El has been doing very well.  He reports no cardiac issues.  He exercises frequently including heavy weightlifting and cardio with no chest pain, shortness of breath, or other symptoms.  He did cut down on his propranolol dose from 20 mg twice daily to 10 mg twice daily due to some orthostatic symptoms, but these have resolved at the lower dose.  His blood pressure today is 122/78, and he reports his blood pressure is always well controlled recently.    His most recent labs are from 4/20/2021, and show normal electrolytes, creatinine of 1.18 with estimated GFR of 67, total cholesterol of 166, HDL 75, LDL of 67, and triglycerides of 121.  His most recent carotid ultrasound is from 8/13/2019, and shows less than 50%  relative stenosis of the ICA bilaterally, with extensive calcified and noncalcified plaque of the left carotid but not significantly changed from prior.  His abdominal aortic ultrasound from 8/13/2019 shows aneurysmal dilation of the infrarenal abdominal aorta up to 3.2 x 3.3 cm, which was stable from prior.  Finally, his left renal duplex from 8/13/2019 shows a widely patent left renal artery stent.        Assessment & Plan     1. Asymptomatic coronary artery disease by CT scan  2. Carotid artery disease s/p right CEA in 2016  3. Resistant hypertension, much improved following left renal artery stenting in 2016  4. Mild infrarenal abdominal aortic aneurysm (3.2 x 3.3 cm in 2019)  5. Hyperlipidemia  6. Former tobacco abuse      Overall, El continues to do very well.  We will plan to continue his current medications, including the lower dose propranolol that he has self adjusted to.  His blood pressure is very well controlled at present.  I encouraged him to continue with his excellent diet and exercise regimen.  We will also repeat his screening abdominal aortic ultrasound and bilateral carotid duplex.  We will plan to see him in 1 year unless new issues arise in the meantime.      -Carotid duplex ultrasound  -Abdominal aortic ultrasound  -Continue aspirin 81 mg daily  -Continue Crestor 20 mg daily and Zetia 10 mg daily  -Continue irbesartan 75 mg twice daily and propranolol 10 mg twice daily      Follow-up: 1 year, with lipids every 2 years        Apolinar Hernández MD  Interventional Cardiology  April 21, 2021        Medications   Current Outpatient Medications   Medication     acetaminophen (TYLENOL) 325 MG tablet     aspirin 81 MG tablet     Coenzyme Q10 (CO Q 10) 100 MG CAPS     ezetimibe (ZETIA) 10 MG tablet     irbesartan (AVAPRO) 75 MG tablet     Menaquinone-7 (VITAMIN K2) 100 MCG CAPS     propranolol (INDERAL) 10 MG tablet     rosuvastatin (CRESTOR) 20 MG tablet     Turmeric (CURCUMIN 95) 500 MG CAPS      "UNABLE TO FIND     vitamin  B complex with vitamin C (VITAMIN  B COMPLEX) TABS     No current facility-administered medications for this visit.      Allergies   Allergies   Allergen Reactions     Simvastatin Muscle Pain (Myalgia)     Ace Inhibitors Cough     Carvedilol      Palpitations, syncope     Clonidine Other (See Comments)     Mental fatigue, somnolence     Labetalol Other (See Comments)     diffuse weakness, muscle cramps, stocking/glove fasiculations, urinary hesitancy, muscle cramps     Metoprolol Other (See Comments)     Severe fatigue, \"made heart jump\"     Amlodipine Other (See Comments)     Palpitations, dizziness         Physical Exam       BP: 122/78 Pulse: 69     SpO2: 97 %      Vital Signs with Ranges  Pulse:  [69] 69  BP: (122)/(78) 122/78  SpO2:  [97 %] 97 %  158 lbs 4.8 oz    Constitutional: Well-appearing, no acute distress  Respiratory: Normal respiratory effort, CTAB  Cardiovascular: RRR, no m/r/g.  JVP < 7 cm H2O.  There is no LE edema.  Normal carotid upstrokes, no carotid bruits.    Thank you for allowing me to participate in the care of your patient.      Sincerely,     Apolinar Hernández MD     [facility]    cc:   No referring provider defined for this encounter.        "

## 2021-04-27 ENCOUNTER — HOSPITAL ENCOUNTER (OUTPATIENT)
Dept: ULTRASOUND IMAGING | Facility: CLINIC | Age: 61
End: 2021-04-27
Attending: INTERNAL MEDICINE
Payer: COMMERCIAL

## 2021-04-27 DIAGNOSIS — I65.23 BILATERAL CAROTID ARTERY STENOSIS: ICD-10-CM

## 2021-04-27 DIAGNOSIS — I71.40 AAA (ABDOMINAL AORTIC ANEURYSM) WITHOUT RUPTURE (H): ICD-10-CM

## 2021-04-27 PROCEDURE — 93978 VASCULAR STUDY: CPT

## 2021-04-27 PROCEDURE — 93880 EXTRACRANIAL BILAT STUDY: CPT | Mod: 26 | Performed by: INTERNAL MEDICINE

## 2021-04-27 PROCEDURE — 93880 EXTRACRANIAL BILAT STUDY: CPT

## 2021-04-27 PROCEDURE — 93978 VASCULAR STUDY: CPT | Mod: 26 | Performed by: INTERNAL MEDICINE

## 2021-06-30 DIAGNOSIS — E78.5 HYPERLIPIDEMIA WITH TARGET LDL LESS THAN 130: ICD-10-CM

## 2021-06-30 RX ORDER — IRBESARTAN 150 MG/1
150 TABLET ORAL 2 TIMES DAILY
Qty: 180 TABLET | Refills: 3 | Status: SHIPPED | OUTPATIENT
Start: 2021-06-30 | End: 2022-07-06

## 2021-06-30 NOTE — TELEPHONE ENCOUNTER
Received call from pharmacy requesting to clarify dose of irbesartan. Per review of chart and pharmacy records pt has been taking irbesartan 150 mg twice daily, however at last visit with Dr. Hernández on 4/21/21 prescription was sent to pharmacy for irbesartan 75 mg twice daily. Per pharmacy staff pt was not aware of a dose decrease. Per Dr. Hernández's note pt to continue irbesartan with no change. Sent in new order for correct dose of irbesartan 150 mg twice daily.

## 2021-08-23 ENCOUNTER — TRANSFERRED RECORDS (OUTPATIENT)
Dept: HEALTH INFORMATION MANAGEMENT | Facility: CLINIC | Age: 61
End: 2021-08-23
Payer: COMMERCIAL

## 2021-08-26 ENCOUNTER — TRANSFERRED RECORDS (OUTPATIENT)
Dept: HEALTH INFORMATION MANAGEMENT | Facility: CLINIC | Age: 61
End: 2021-08-26
Payer: COMMERCIAL

## 2021-09-17 DIAGNOSIS — I15.0 RENOVASCULAR HYPERTENSION: Primary | ICD-10-CM

## 2021-10-05 DIAGNOSIS — E78.5 HYPERLIPIDEMIA LDL GOAL <70: ICD-10-CM

## 2021-10-05 RX ORDER — EZETIMIBE 10 MG/1
10 TABLET ORAL AT BEDTIME
Qty: 90 TABLET | Refills: 3 | Status: SHIPPED | OUTPATIENT
Start: 2021-10-05 | End: 2022-07-20

## 2021-10-24 ENCOUNTER — HEALTH MAINTENANCE LETTER (OUTPATIENT)
Age: 61
End: 2021-10-24

## 2022-02-13 ENCOUNTER — HEALTH MAINTENANCE LETTER (OUTPATIENT)
Age: 62
End: 2022-02-13

## 2022-03-21 ENCOUNTER — TELEPHONE (OUTPATIENT)
Dept: OTHER | Facility: CLINIC | Age: 62
End: 2022-03-21
Payer: COMMERCIAL

## 2022-03-21 DIAGNOSIS — I15.0 RENOVASCULAR HYPERTENSION: Primary | ICD-10-CM

## 2022-03-21 NOTE — TELEPHONE ENCOUNTER
Scheduled as follows:    Future Appointments   Date Time Provider Department Center   3/29/2022  8:00 AM [facility]   4/7/2022  3:30 PM Vaibhav Yoder [facility]   5/4/2022  7:45 AM Apolinar Hernández MD [facility] [place]    [facility] [place]   [phone]

## 2022-03-21 NOTE — TELEPHONE ENCOUNTER
[facility]     Who is the name of the provider?  Beba    What is the location you see this provider at?    [facility]    Reason for call:  Pt is wanting to schedule his follow up with Dr. Yoder. Please advise as to what needs to be done- I see a 2021 limited renal (needs doppler code) but no carotid or Aorta orders.    : El    Phone number to call: [phone]     Additional Notes: OK to DIMITRY

## 2022-03-21 NOTE — TELEPHONE ENCOUNTER
LOV 10/17/19 with Dr. Yoder.   Pt was to have repeat imaging in 2 yrs. However,   Carotid U/S and US aorta/ivc/iliac duplex complete was done by Dr. Hernández in 4/27/21, results in Epic.     Routing to  to coordinate US renal limited as ordered, then video visit or phone visit with Dr. Yoder.     Appt note: 2 yr f/u to 10/17/19; Hx of right CEA 7/26/16, juxtarenal AAA and Left renal artery stenting 8/12/16. (imaging in Epic).     SHADY SilveiraN, RN  [facility]  Office:  [phone] Fax: [phone]

## 2022-03-29 ENCOUNTER — HOSPITAL ENCOUNTER (OUTPATIENT)
Dept: ULTRASOUND IMAGING | Facility: CLINIC | Age: 62
Discharge: HOME | End: 2022-03-29
Attending: SURGERY | Admitting: SURGERY
Payer: COMMERCIAL

## 2022-03-29 DIAGNOSIS — I15.0 RENOVASCULAR HYPERTENSION: ICD-10-CM

## 2022-03-29 PROCEDURE — 93976 VASCULAR STUDY: CPT

## 2022-04-06 NOTE — PROGRESS NOTES
[place] [facility]    El Newell returns for vascular follow-up.  90% right carotid stenosis with CEA 7/26/2016.  Renovascular hypertension due to left renal artery stenosis treated with angioplasty and stenting  No significant PAD.   8/13/2019 carotid duplex widely patent right CEA with normal velocities.  Diffuse wall thickening of the left carotid and ICA with no significant stenosis.    PMH: Medications: Avapro, Inderal, Crestor, Zetia, aspirin   Medical: Hypertension    Renovascular hypertension treated with left  renal stent    Mild aortic ectasia-3.17 x 3.30 cm.  Normal iliacs.    Hyperlipidemia on statin-last LDL= 67    Minimal renal disease with SCr= 1.18 and GFR= 67    3/29/2022 renal artery duplex was reviewed.  Widely patent left renal stent with normal velocities and resistive index of 0.70.  Mild stenosis in the right with elevated velocities but still diameter over 5.5 mm on the right of no clinical concern.        4/27/2021 carotid duplex revealed mild diffuse disease with normal velocities due to calcified plaque in the left carotid.  Widely patent right CEA with minimal wall thickening and normal velocities.        TELEPHONE CONSULT: We spoke with El on the phone today to go over these findings.  He is doing excellent.  He works out on a regular basis with no issues at all.  Still working full-time.  Blood pressure at home in the range of 130/80 consistently on his 2 blood pressure medications.  No cerebrovascular symptoms.      IMPRESSION: #1.  Successful angioplasty and stenting for renal vascular hypertension.  With very well-controlled blood pressure with essentially normal renal function.  Would repeat exam in 1 year.     #2.  Widely patent right CEA with mild disease in the left on duplex ultrasound 4/21.  Would repeat this in approximately 2 years.     #3.  Aortic ectasia with maximum diameter 3.30 cm.  No obvious aneurysmal change but due to the diameter would repeat  aortic ultrasound of the time of his renal artery duplex in 1 year     #4.  Good risk factor control.  Well-controlled blood pressure, LDL at goal on statin, and active lifestyle with good diet.    15 minutes on phone today with patient being completed at 1515 hrs.      Viabhav Yoder MD  This note was created using Dragon voice recognition software which may result in transcription errors.

## 2022-04-07 ENCOUNTER — VIRTUAL VISIT (OUTPATIENT)
Dept: OTHER | Facility: CLINIC | Age: 62
End: 2022-04-07
Attending: SURGERY
Payer: COMMERCIAL

## 2022-04-07 DIAGNOSIS — Z98.890 HISTORY OF RIGHT-SIDED CAROTID ENDARTERECTOMY: ICD-10-CM

## 2022-04-07 DIAGNOSIS — I15.0 RENOVASCULAR HYPERTENSION: Primary | ICD-10-CM

## 2022-04-07 DIAGNOSIS — E78.5 HYPERLIPIDEMIA LDL GOAL <70: ICD-10-CM

## 2022-04-07 DIAGNOSIS — I71.40 ABDOMINAL AORTIC ANEURYSM (AAA) 3.0 CM TO 5.5 CM IN DIAMETER IN MALE (H): ICD-10-CM

## 2022-04-07 PROCEDURE — 99213 OFFICE O/P EST LOW 20 MIN: CPT | Mod: TEL | Performed by: SURGERY

## 2022-04-07 NOTE — PROGRESS NOTES
El is a 61 year old who is being evaluated via a billable telephone visit.      What phone number would you like to be contacted at? [phone]  How would you like to obtain your AVS? Rylee Sung

## 2022-05-04 ENCOUNTER — OFFICE VISIT (OUTPATIENT)
Dept: CARDIOLOGY | Facility: CLINIC | Age: 62
End: 2022-05-04
Payer: COMMERCIAL

## 2022-05-04 VITALS
HEIGHT: 67 IN | DIASTOLIC BLOOD PRESSURE: 75 MMHG | WEIGHT: 158 LBS | HEART RATE: 64 BPM | BODY MASS INDEX: 24.8 KG/M2 | SYSTOLIC BLOOD PRESSURE: 119 MMHG

## 2022-05-04 DIAGNOSIS — I65.23 BILATERAL CAROTID ARTERY STENOSIS: ICD-10-CM

## 2022-05-04 DIAGNOSIS — I71.40 AAA (ABDOMINAL AORTIC ANEURYSM) WITHOUT RUPTURE (H): ICD-10-CM

## 2022-05-04 DIAGNOSIS — I10 ESSENTIAL HYPERTENSION, BENIGN: ICD-10-CM

## 2022-05-04 DIAGNOSIS — I25.10 CORONARY ARTERY DISEASE INVOLVING NATIVE CORONARY ARTERY OF NATIVE HEART WITHOUT ANGINA PECTORIS: Primary | ICD-10-CM

## 2022-05-04 DIAGNOSIS — E78.2 MIXED HYPERLIPIDEMIA: ICD-10-CM

## 2022-05-04 PROCEDURE — 99214 OFFICE O/P EST MOD 30 MIN: CPT | Performed by: INTERNAL MEDICINE

## 2022-05-04 NOTE — LETTER
5/4/2022    Lee Dave MD  [address] [facility] [address]    RE: El Newell       Dear Colleague,     I had the pleasure of seeing El Newell in the [facility].  CARDIOLOGY CLINIC FOLLOW-UP NOTE      REASON FOR VISIT:   Follow-up CAD, renal artery stenosis s/p stenting    PRIMARY CARE PHYSICIAN:  Lee Dave        History of Present Illness   El Newell is an extremely pleasant 61 year old male, previously a patient of Dr. Thompson until his assisted, here for routine follow-up.  Briefly, he has a history of abnormal coronary CTA around 2016 showing evidence of calcified plaque in multiple vessels.  This was then followed by an exercise MPI where he exercised for 15 minutes and achieved 17 METS without developing any symptoms and with normal perfusion at rest and stress, so he did not go for invasive coronary angiography.  He also has dealt with resistant hypertension and underwent successful stenting of the left renal artery by Dr. Faulkner in 2016, severe right carotid atherosclerosis s/p successful right CEA in 2016, hyperlipidemia, and former tobacco abuse.    Since his last visit with me on 4/21/2021, El reports that he continues to do very well.  He continues to be extremely physically active, doing heavy bodybuilding exercises 3 days/week and cardio 2 days/week.  With this, he has no chest pains, shortness of breath, or any other exertional symptoms.  He denies any lightheadedness, syncope, palpitations, or lower extremity swelling.    His most recent labs are from 4/20/2021, and show normal electrolytes, creatinine of 1.18 with estimated GFR of 67, total cholesterol of 166, HDL 75, LDL of 67, and triglycerides of 121.  His most recent carotid ultrasound is from 4/27/2021, and shows less than 50% relative stenosis of the ICA bilaterally.  His abdominal aortic ultrasound from 4/27/2021 shows aneurysmal dilation of the infrarenal abdominal  aorta up to 3.0 x 3.3 cm, which was stable to slightly improved from prior.  Finally, his left renal duplex from 3/29/2022 shows a widely patent left renal artery stent.        Assessment & Plan     1. Asymptomatic CAD by CT scan  2. Carotid artery disease s/p right CEA in 2016  3. Resistant hypertension, much improved following left renal artery stenting in 2016  4. Mild infrarenal abdominal aortic aneurysm (3.0 x 3.3 cm in 2021)  5. Hyperlipidemia, well controlled  6. Former tobacco abuse      Overall, El continues to do very well.  I encouraged him to keep up with his excellent diet and exercise regimen, and to continue to maintain complete tobacco cessation.  We will continue all of his current medications unchanged.  We will plan for lipids every other year, and carotid duplex ultrasound/abdominal aortic ultrasound every 3 years.      -Carotid duplex ultrasound due in 2024  -Abdominal aortic ultrasound due in 2024  -Continue aspirin 81 mg daily  -Continue Crestor 20 mg daily and Zetia 10 mg daily  -Continue irbesartan 150 mg twice daily and propranolol 10 mg twice daily      Follow-up: 1 year, with chemistry and lipid panel beforehand        Apolinar Hernández MD  Interventional Cardiology  May 4, 2022        Medications   Current Outpatient Medications   Medication     acetaminophen (TYLENOL) 325 MG tablet     aspirin 81 MG tablet     Coenzyme Q10 (CO Q 10) 100 MG CAPS     ezetimibe (ZETIA) 10 MG tablet     irbesartan (AVAPRO) 150 MG tablet     Menaquinone-7 (VITAMIN K2) 100 MCG CAPS     propranolol (INDERAL) 10 MG tablet     rosuvastatin (CRESTOR) 20 MG tablet     Turmeric 500 MG CAPS     UNABLE TO FIND     vitamin B complex with vitamin C (STRESS TAB) tablet     No current facility-administered medications for this visit.     Allergies   Allergies   Allergen Reactions     Simvastatin Muscle Pain (Myalgia)     Ace Inhibitors Cough     Carvedilol      Palpitations, syncope     Clonidine Other (See Comments)  "    Mental fatigue, somnolence     Labetalol Other (See Comments)     diffuse weakness, muscle cramps, stocking/glove fasiculations, urinary hesitancy, muscle cramps     Metoprolol Other (See Comments)     Severe fatigue, \"made heart jump\"     Amlodipine Other (See Comments)     Palpitations, dizziness         Physical Exam       BP: 119/75 Pulse: 64            Vital Signs with Ranges  Pulse:  [64] 64  BP: (119)/(75) 119/75  158 lbs 0 oz    Constitutional: Well-appearing, no acute distress  Respiratory: Normal respiratory effort, CTAB  Cardiovascular: RRR, faint early peaking systolic murmur at the base.  JVP < 7 cm H2O.  There is no LE edema.  Normal carotid upstrokes, no carotid bruits.        Thank you for allowing me to participate in the care of your patient.      Sincerely,     Apolinar Hernández MD     [facility]  cc:   Apolinar Hernández MD  [address] [address]        "

## 2022-05-04 NOTE — PROGRESS NOTES
CARDIOLOGY CLINIC FOLLOW-UP NOTE      REASON FOR VISIT:   Follow-up CAD, renal artery stenosis s/p stenting    PRIMARY CARE PHYSICIAN:  Lee Dave        History of Present Illness   El Newell is an extremely pleasant 61 year old male, previously a patient of Dr. Thompson until his [facility], here for routine follow-up.  Briefly, he has a history of abnormal coronary CTA around 2016 showing evidence of calcified plaque in multiple vessels.  This was then followed by an exercise MPI where he exercised for 15 minutes and achieved 17 METS without developing any symptoms and with normal perfusion at rest and stress, so he did not go for invasive coronary angiography.  He also has dealt with resistant hypertension and underwent successful stenting of the left renal artery by Dr. Faulkner in 2016, severe right carotid atherosclerosis s/p successful right CEA in 2016, hyperlipidemia, and former tobacco abuse.    Since his last visit with me on 4/21/2021, El reports that he continues to do very well.  He continues to be extremely physically active, doing heavy bodybuilding exercises 3 days/week and cardio 2 days/week.  With this, he has no chest pains, shortness of breath, or any other exertional symptoms.  He denies any lightheadedness, syncope, palpitations, or lower extremity swelling.    His most recent labs are from 4/20/2021, and show normal electrolytes, creatinine of 1.18 with estimated GFR of 67, total cholesterol of 166, HDL 75, LDL of 67, and triglycerides of 121.  His most recent carotid ultrasound is from 4/27/2021, and shows less than 50% relative stenosis of the ICA bilaterally.  His abdominal aortic ultrasound from 4/27/2021 shows aneurysmal dilation of the infrarenal abdominal aorta up to 3.0 x 3.3 cm, which was stable to slightly improved from prior.  Finally, his left renal duplex from 3/29/2022 shows a widely patent left renal artery stent.        Assessment & Plan     1. Asymptomatic  CAD by CT scan  2. Carotid artery disease s/p right CEA in 2016  3. Resistant hypertension, much improved following left renal artery stenting in 2016  4. Mild infrarenal abdominal aortic aneurysm (3.0 x 3.3 cm in 2021)  5. Hyperlipidemia, well controlled  6. Former tobacco abuse      Overall, El continues to do very well.  I encouraged him to keep up with his excellent diet and exercise regimen, and to continue to maintain complete tobacco cessation.  We will continue all of his current medications unchanged.  We will plan for lipids every other year, and carotid duplex ultrasound/abdominal aortic ultrasound every 3 years.      -Carotid duplex ultrasound due in 2024  -Abdominal aortic ultrasound due in 2024  -Continue aspirin 81 mg daily  -Continue Crestor 20 mg daily and Zetia 10 mg daily  -Continue irbesartan 150 mg twice daily and propranolol 10 mg twice daily      Follow-up: 1 year, with chemistry and lipid panel beforehand        Apolinar Hernández MD  Interventional Cardiology  May 4, 2022        Medications   Current Outpatient Medications   Medication     acetaminophen (TYLENOL) 325 MG tablet     aspirin 81 MG tablet     Coenzyme Q10 (CO Q 10) 100 MG CAPS     ezetimibe (ZETIA) 10 MG tablet     irbesartan (AVAPRO) 150 MG tablet     Menaquinone-7 (VITAMIN K2) 100 MCG CAPS     propranolol (INDERAL) 10 MG tablet     rosuvastatin (CRESTOR) 20 MG tablet     Turmeric 500 MG CAPS     UNABLE TO FIND     vitamin B complex with vitamin C (STRESS TAB) tablet     No current facility-administered medications for this visit.     Allergies   Allergies   Allergen Reactions     Simvastatin Muscle Pain (Myalgia)     Ace Inhibitors Cough     Carvedilol      Palpitations, syncope     Clonidine Other (See Comments)     Mental fatigue, somnolence     Labetalol Other (See Comments)     diffuse weakness, muscle cramps, stocking/glove fasiculations, urinary hesitancy, muscle cramps     Metoprolol Other (See Comments)     Severe  "fatigue, \"made heart jump\"     Amlodipine Other (See Comments)     Palpitations, dizziness         Physical Exam       BP: 119/75 Pulse: 64            Vital Signs with Ranges  Pulse:  [64] 64  BP: (119)/(75) 119/75  158 lbs 0 oz    Constitutional: Well-appearing, no acute distress  Respiratory: Normal respiratory effort, CTAB  Cardiovascular: RRR, faint early peaking systolic murmur at the base.  JVP < 7 cm H2O.  There is no LE edema.  Normal carotid upstrokes, no carotid bruits.    "

## 2022-05-27 DIAGNOSIS — I10 ESSENTIAL HYPERTENSION, BENIGN: ICD-10-CM

## 2022-05-27 RX ORDER — PROPRANOLOL HYDROCHLORIDE 10 MG/1
10 TABLET ORAL 2 TIMES DAILY
Qty: 180 TABLET | Refills: 3 | Status: SHIPPED | OUTPATIENT
Start: 2022-05-27 | End: 2023-04-17

## 2022-06-22 DIAGNOSIS — I25.10 CORONARY ARTERY DISEASE INVOLVING NATIVE CORONARY ARTERY OF NATIVE HEART WITHOUT ANGINA PECTORIS: ICD-10-CM

## 2022-06-22 DIAGNOSIS — E78.2 MIXED HYPERLIPIDEMIA: ICD-10-CM

## 2022-06-22 RX ORDER — ROSUVASTATIN CALCIUM 20 MG/1
20 TABLET, COATED ORAL DAILY
Qty: 90 TABLET | Refills: 3 | Status: SHIPPED | OUTPATIENT
Start: 2022-06-22 | End: 2023-06-29

## 2022-07-06 DIAGNOSIS — E78.5 HYPERLIPIDEMIA WITH TARGET LDL LESS THAN 130: ICD-10-CM

## 2022-07-06 RX ORDER — IRBESARTAN 150 MG/1
150 TABLET ORAL 2 TIMES DAILY
Qty: 180 TABLET | Refills: 3 | Status: SHIPPED | OUTPATIENT
Start: 2022-07-06 | End: 2023-10-02

## 2022-07-20 DIAGNOSIS — E78.5 HYPERLIPIDEMIA LDL GOAL <70: ICD-10-CM

## 2022-07-20 RX ORDER — EZETIMIBE 10 MG/1
10 TABLET ORAL AT BEDTIME
Qty: 90 TABLET | Refills: 3 | Status: SHIPPED | OUTPATIENT
Start: 2022-07-20 | End: 2023-08-17

## 2022-07-20 NOTE — TELEPHONE ENCOUNTER
Received refill request for: [facility]  Last OV was: 22 Dr. Hernández  Labs/EK21 Lipids  F/U scheduled: Orders for 2023  New script sent to: [facility]

## 2022-10-15 ENCOUNTER — HEALTH MAINTENANCE LETTER (OUTPATIENT)
Age: 62
End: 2022-10-15

## 2023-03-25 ENCOUNTER — HEALTH MAINTENANCE LETTER (OUTPATIENT)
Age: 63
End: 2023-03-25

## 2023-04-11 ENCOUNTER — NURSE TRIAGE (OUTPATIENT)
Dept: NURSING | Facility: CLINIC | Age: 63
End: 2023-04-11
Payer: COMMERCIAL

## 2023-04-11 ENCOUNTER — HOSPITAL ENCOUNTER (EMERGENCY)
Facility: CLINIC | Age: 63
Discharge: HOME | End: 2023-04-11
Attending: EMERGENCY MEDICINE | Admitting: EMERGENCY MEDICINE
Payer: COMMERCIAL

## 2023-04-11 VITALS
OXYGEN SATURATION: 99 % | RESPIRATION RATE: 16 BRPM | HEIGHT: 66 IN | DIASTOLIC BLOOD PRESSURE: 75 MMHG | WEIGHT: 158 LBS | HEART RATE: 68 BPM | TEMPERATURE: 97.5 F | BODY MASS INDEX: 25.39 KG/M2 | SYSTOLIC BLOOD PRESSURE: 148 MMHG

## 2023-04-11 DIAGNOSIS — F43.0 STRESS RESPONSE: ICD-10-CM

## 2023-04-11 DIAGNOSIS — I10 HYPERTENSION, UNSPECIFIED TYPE: ICD-10-CM

## 2023-04-11 LAB
ALBUMIN SERPL BCG-MCNC: 4.3 G/DL (ref 3.5–5.2)
ALP SERPL-CCNC: 68 U/L (ref 40–129)
ALT SERPL W P-5'-P-CCNC: 21 U/L (ref 10–50)
ANION GAP SERPL CALCULATED.3IONS-SCNC: 9 MMOL/L (ref 7–15)
AST SERPL W P-5'-P-CCNC: 29 U/L (ref 10–50)
ATRIAL RATE - MUSE: 73 BPM
BASOPHILS # BLD AUTO: 0 10E3/UL (ref 0–0.2)
BASOPHILS NFR BLD AUTO: 0 %
BILIRUB SERPL-MCNC: 0.6 MG/DL
BUN SERPL-MCNC: 11.2 MG/DL (ref 8–23)
CALCIUM SERPL-MCNC: 9.2 MG/DL (ref 8.8–10.2)
CHLORIDE SERPL-SCNC: 102 MMOL/L (ref 98–107)
CREAT SERPL-MCNC: 0.95 MG/DL (ref 0.67–1.17)
DEPRECATED HCO3 PLAS-SCNC: 25 MMOL/L (ref 22–29)
DIASTOLIC BLOOD PRESSURE - MUSE: NORMAL MMHG
EOSINOPHIL # BLD AUTO: 0.1 10E3/UL (ref 0–0.7)
EOSINOPHIL NFR BLD AUTO: 3 %
ERYTHROCYTE [DISTWIDTH] IN BLOOD BY AUTOMATED COUNT: 11.5 % (ref 10–15)
GFR SERPL CREATININE-BSD FRML MDRD: >90 ML/MIN/1.73M2
GLUCOSE SERPL-MCNC: 227 MG/DL (ref 70–99)
HCT VFR BLD AUTO: 40.4 % (ref 40–53)
HGB BLD-MCNC: 14.2 G/DL (ref 13.3–17.7)
IMM GRANULOCYTES # BLD: 0 10E3/UL
IMM GRANULOCYTES NFR BLD: 0 %
INTERPRETATION ECG - MUSE: NORMAL
LYMPHOCYTES # BLD AUTO: 0.9 10E3/UL (ref 0.8–5.3)
LYMPHOCYTES NFR BLD AUTO: 19 %
MCH RBC QN AUTO: 33.1 PG (ref 26.5–33)
MCHC RBC AUTO-ENTMCNC: 35.1 G/DL (ref 31.5–36.5)
MCV RBC AUTO: 94 FL (ref 78–100)
MONOCYTES # BLD AUTO: 0.8 10E3/UL (ref 0–1.3)
MONOCYTES NFR BLD AUTO: 17 %
NEUTROPHILS # BLD AUTO: 2.8 10E3/UL (ref 1.6–8.3)
NEUTROPHILS NFR BLD AUTO: 61 %
NRBC # BLD AUTO: 0 10E3/UL
NRBC BLD AUTO-RTO: 0 /100
P AXIS - MUSE: 46 DEGREES
PLATELET # BLD AUTO: 202 10E3/UL (ref 150–450)
POTASSIUM SERPL-SCNC: 4.1 MMOL/L (ref 3.4–5.3)
PR INTERVAL - MUSE: 160 MS
PROT SERPL-MCNC: 6.8 G/DL (ref 6.4–8.3)
QRS DURATION - MUSE: 96 MS
QT - MUSE: 380 MS
QTC - MUSE: 418 MS
R AXIS - MUSE: 23 DEGREES
RBC # BLD AUTO: 4.29 10E6/UL (ref 4.4–5.9)
SODIUM SERPL-SCNC: 136 MMOL/L (ref 136–145)
SYSTOLIC BLOOD PRESSURE - MUSE: NORMAL MMHG
T AXIS - MUSE: 19 DEGREES
TROPONIN T SERPL HS-MCNC: 10 NG/L
VENTRICULAR RATE- MUSE: 73 BPM
WBC # BLD AUTO: 4.6 10E3/UL (ref 4–11)

## 2023-04-11 PROCEDURE — 93005 ELECTROCARDIOGRAM TRACING: CPT

## 2023-04-11 PROCEDURE — 80053 COMPREHEN METABOLIC PANEL: CPT | Performed by: EMERGENCY MEDICINE

## 2023-04-11 PROCEDURE — 99284 EMERGENCY DEPT VISIT MOD MDM: CPT

## 2023-04-11 PROCEDURE — 36415 COLL VENOUS BLD VENIPUNCTURE: CPT | Performed by: EMERGENCY MEDICINE

## 2023-04-11 PROCEDURE — 85018 HEMOGLOBIN: CPT | Performed by: EMERGENCY MEDICINE

## 2023-04-11 PROCEDURE — 84484 ASSAY OF TROPONIN QUANT: CPT | Performed by: EMERGENCY MEDICINE

## 2023-04-11 RX ORDER — DIAZEPAM 5 MG
5 TABLET ORAL EVERY 6 HOURS PRN
Qty: 10 TABLET | Refills: 0 | Status: SHIPPED | OUTPATIENT
Start: 2023-04-11

## 2023-04-11 ASSESSMENT — ENCOUNTER SYMPTOMS
APPETITE CHANGE: 0
GASTROINTESTINAL NEGATIVE: 1
NERVOUS/ANXIOUS: 1

## 2023-04-11 NOTE — ED TRIAGE NOTES
Seen yesterday for pre op eval and SBP in the 200s, this morning prior to taking AM meds; SBP in the 200s/ c/o dizziness for a couple of days

## 2023-04-11 NOTE — ED PROVIDER NOTES
History     Chief Complaint:  Hypertension       The history is provided by the patient.      El Newell is a 62 year old male with a history of hypertension, carotid and renal artery stenosis, hyperlipidemia, CAD, and AAA who presents with concerns of hypertension with a blood pressure of 200s/100s this morning prior to taking hypertension medications.  He had an H&P a few days ago before onset of hypertension, which was normal.  Yesterday, he had a right foot surgery pre-op evaluation and was found to be hypertensive at 200s systolic.  He was told to monitor his blood pressure overnight and to be seen in the ED if it continued to be elevated this morning.  After measuring his blood pressure this morning, he took his medications and old valium that he found, and had some improvement with this.  Here, he reports possibility of feeling stressed or anxious about the surgery in 2 days, which may be contributing to the elevated blood pressure.  He denies history of suicidal ideation and depression.  He denies any other current mental health symptoms.  He also notes that 4-5 years ago, he was found to have a renal artery stenosis and right carotid stenosis that required surgical intervention.  He had been following up with his cardiologist and last saw them 1 year ago.  He does take his hypertension medications regularly and is active daily.  He does not check his blood pressure regularly, and last check was likely 6 months ago.  He denies changes in urination, bowel movements, and appetite.  He denies leg swelling and chest pain.      Independent Historian:   None - Patient Only    Review of External Notes: na     ROS:  Review of Systems   Constitutional: Negative for appetite change.   Cardiovascular: Negative for chest pain and leg swelling.   Gastrointestinal: Negative.    Genitourinary: Negative.    Psychiatric/Behavioral: Negative for suicidal ideas. The patient is nervous/anxious.    All other systems  "reviewed and are negative.      Allergies:  Simvastatin  Ace Inhibitors  Carvedilol  Clonidine  Labetalol  Metoprolol  Amlodipine     Medications:    Aspirin  Zetia  Irbesartan   Propranolol  Rosuvastatin     Past Medical History:    AAA  Carotid stenosis  CAD  GERD  Hyperlipidemia  Hypertension  Renal artery stenosis     Past Surgical History:    Appendectomy  Endarterectomy carotid  Fusion spine L5-S1     Family History:     CAD  Hypertension  MI  Leukemia    Social History:  The patient presents alone   PCP: Lee Dave     Physical Exam     Patient Vitals for the past 24 hrs:   BP Temp Temp src Pulse Resp SpO2 Height Weight   04/11/23 1011 (!) 148/75 -- -- -- -- -- -- --   04/11/23 0848 (!) 153/78 97.5  F (36.4  C) Temporal 68 16 99 % 1.676 m (5' 6\") 71.7 kg (158 lb)        Physical Exam    GENERAL: well developed, pleasant  HEAD: atraumatic  EYES: pupils reactive, extraocular muscles intact, conjunctivae normal  ENT:  mucus membranes moist  NECK:  trachea midline, normal range of motion  RESPIRATORY: no tachypnea, breath sounds clear to auscultation   CVS: normal S1/S2, no murmurs, intact distal pulses  ABDOMEN: soft, nontender, nondistention  MUSCULOSKELETAL: no deformities  SKIN: warm and dry, no acute rashes or ulceration  NEURO: GCS 15, cranial nerves intact, alert and oriented x3  PSYCH:  Mood/affect normal    Emergency Department Course   ECG  ECG taken at 0851, ECG read   Sinus rhythm   Nonspecific ST abnormality   Abnormal ECG  Rate 73 bpm. GA interval 160 ms. QRS duration 96 ms. QT/QTc 380/418 ms. P-R-T axes 43 26 19.     Laboratory:  Labs Ordered and Resulted from Time of ED Arrival to Time of ED Departure   COMPREHENSIVE METABOLIC PANEL - Abnormal       Result Value    Sodium 136      Potassium 4.1      Chloride 102      Carbon Dioxide (CO2) 25      Anion Gap 9      Urea Nitrogen 11.2      Creatinine 0.95      Calcium 9.2      Glucose 227 (*)     Alkaline Phosphatase 68      AST 29      " ALT 21      Protein Total 6.8      Albumin 4.3      Bilirubin Total 0.6      GFR Estimate >90     CBC WITH PLATELETS AND DIFFERENTIAL - Abnormal    WBC Count 4.6      RBC Count 4.29 (*)     Hemoglobin 14.2      Hematocrit 40.4      MCV 94      MCH 33.1 (*)     MCHC 35.1      RDW 11.5      Platelet Count 202      % Neutrophils 61      % Lymphocytes 19      % Monocytes 17      % Eosinophils 3      % Basophils 0      % Immature Granulocytes 0      NRBCs per 100 WBC 0      Absolute Neutrophils 2.8      Absolute Lymphocytes 0.9      Absolute Monocytes 0.8      Absolute Eosinophils 0.1      Absolute Basophils 0.0      Absolute Immature Granulocytes 0.0      Absolute NRBCs 0.0     TROPONIN T, HIGH SENSITIVITY - Normal    Troponin T, High Sensitivity 10          Emergency Department Course & Assessments:    Assessments:  0944 I gathered history and examined the patient as noted above. I believe that they are safe for discharge at this time.     Social Determinants of Health affecting care:   None    Disposition:  The patient was discharged to home.     Impression & Plan      Medical Decision Making:  Patient presents with HTN concerns as he presented for a pre-op.  He notes he recently had an H&P and had normal BP at that time.  He certainly has a complex HTN history.  He has no complaints.  He is a psychiatrist.  He had an old valium and took that prior to arrival and notes his pressures have improved significantly.  Likely stress response due to upcoming surgery.  No signs of end organ disease.      Diagnosis:    ICD-10-CM    1. Hypertension, unspecified type  I10       2. Stress response  F43.0            Discharge Medications:  Discharge Medication List as of 4/11/2023  9:58 AM      START taking these medications    Details   diazepam (VALIUM) 5 MG tablet Take 1 tablet (5 mg) by mouth every 6 hours as needed for anxiety, Disp-10 tablet, R-0, E-Prescribe                Scribe Disclosure:  Inessa DAVIS, am serving as  a scribe at 11:04 AM on 4/11/2023 to document services personally performed by Tashi Leal MD found based on my observations and the provider's statements to me.     4/11/2023   Tashi Leal MD Adams, Shaun L, MD  04/12/23 3152

## 2023-04-11 NOTE — ED NOTES
BP 200s/100s this morning before taking BP meds. Took home meds and an old valium this morning after that reading.

## 2023-04-11 NOTE — TELEPHONE ENCOUNTER
Blood pressure this morning 202/117, 180/115. Patient just nervous about the numbers. Patient rechecked this morning due to a high reading in an appointment yesterday.   Patient reports that he is a little unsteady on his feet.  Protocol recommends ED now  Patient will have another adult drive him now to [facility] ED.  Arin Lopes RN   04/11/23 7:54 AM  [facility] Nurse Advisor      Reason for Disposition    Systolic BP >= 160 OR Diastolic >= 100, and any cardiac or neurologic symptoms (e.g., chest pain, difficulty breathing, unsteady gait, blurred vision)    Additional Information    Negative: Sounds like a life-threatening emergency to the triager    Negative: Symptom is main concern (e.g., headache, chest pain)    Negative: Low blood pressure is main concern    Protocols used: BLOOD PRESSURE - HIGH-A-OH

## 2023-04-12 ENCOUNTER — NURSE TRIAGE (OUTPATIENT)
Dept: CARDIOLOGY | Facility: CLINIC | Age: 63
End: 2023-04-12
Payer: COMMERCIAL

## 2023-04-12 ENCOUNTER — TELEPHONE (OUTPATIENT)
Dept: OTHER | Facility: CLINIC | Age: 63
End: 2023-04-12
Payer: COMMERCIAL

## 2023-04-12 DIAGNOSIS — Z98.890 HISTORY OF RIGHT-SIDED CAROTID ENDARTERECTOMY: Primary | ICD-10-CM

## 2023-04-12 DIAGNOSIS — E78.5 HYPERLIPIDEMIA LDL GOAL <70: ICD-10-CM

## 2023-04-12 NOTE — TELEPHONE ENCOUNTER
"Patient called with concern of high BP readings. He had a pre-op for his foot fracture completed the day before yesterday where his BP read 200/120. Yesterday he says he went to ED for his BP and was given a benzodiazepine thinking it was anxiety-related. His BP at that time was 154/74. Today at re-check with his PCP, BP readings were 180/115, 160/100. He couldn't be cleared for surgery. He states these readings have been after he has taken his medications. He states he has been taking Irbesartan 150mg BID and propranolol 20mg BID. He has no major symptoms other than ringing in his ears more than usual. Routing over to his care team with Dr. Hernández for further follow-up.    1. BLOOD PRESSURE: \"What is the blood pressure?\" \"Did you take at least two measurements 5 minutes apart?\" Today 180/115, 160/100.  2. ONSET: \"When did you take your blood pressure?\" Earlier this morning with PCP.  3. HOW: \"How did you obtain the blood pressure?\" (e.g., visiting nurse, automatic home BP monitor) PCP.  4. HISTORY: \"Do you have a history of high blood pressure?\" Yes. Resistant Hypertension.  5. MEDICATIONS: \"Are you taking any medications for blood pressure?\" \"Have you missed any doses recently?\" Irbesartan 150mg BID and has been taking propranolol 20mg BID.  6. OTHER SYMPTOMS: \"Do you have any symptoms?\" (e.g., headache, chest pain, blurred vision, difficulty breathing, weakness) No major symptoms other than his ears are ringing more than usual.    "

## 2023-04-12 NOTE — TELEPHONE ENCOUNTER
Apolinar Hernández MD  You 43 minutes ago (1:21 PM)     Thanks for letting me know.  To be honest, I don't think there's much else we really should do for his BP right now.  When I saw him in May 2022, his BP was 119/75.  When he saw his PCP last week it was 130/90.  And in the ED it was a little up, but still only 153/78 and 148/75.     I'm not sure what is required to get him cleared from a pre-op standpoint (he may need to be seen again by the same provider/group that initially refused to clear him).  The good news, though, is that it looks like this was probably just a bad combination of not having taken his meds, being anxious for the surgery, and possibly also being in pain related to his foot.  I'm happy to see him earlier if his pre-op provider would like, but from what I can see in the chart, he probably just needs to have his BP rechecked in clinic with his medications in his system (and maybe something for anxiety if his PCP would want to prescribe this).  It doesn't seem as though his medications require major adjustment, and while we should always keep in mind the possibility of something new like a pheochromocytoma, that does not seem to fit with what is going on.       Thanks,   Markie

## 2023-04-12 NOTE — TELEPHONE ENCOUNTER
Left voicemail for pt to call back and discuss.     Pt did have BP check at his PCP's office today and per the notes :  Duong Khoury MD - 04/12/2023 8:15 AM CDT  Formatting of this note might be different from the original.  Pt was seen today for nursing BP recheck. Seen for pre-op last week for upcoming Lisfranc open reduction internal fixation, scheduled for 4/13/23. Patient has been checking his blood pressures at home and has noticed they continue to be high. He does not report any chest pain, shortness breast, dizziness, or any other symptoms at this time. All blood pressure medications are managed by [facility]. At preop, it was discussed that he would come back for blood pressure recheck as his blood pressure was 180/98 at that time. If pressure remained elevated at this visit, plan was to reschedule with surgery and follow up with Cardiology for medication adjustment. Labs from that visit were otherwise stable and okay for surgery. See preop note and lab for more details.    Blood pressure today is 165/100 on blood pressure average. Discussed with patient that he will need to call [facility] today to see if they would sign off on his safety for surgery. If they are not able to verify safety for surgery at current blood pressure from a cardiology standpoint with his history of CAD, he will need to reschedule surgery and see them for blood pressure medication adjustment. Pt agrees with this plan.    Duong Khoury MD PGY-2  [facility]

## 2023-04-12 NOTE — TELEPHONE ENCOUNTER
"[facility]    Who is the name of the provider?:  Dr. Yoder    What is the location you see this provider at/preferred location?: [place]  Person calling / Facility: El Newell  Phone number:  [phone]  Nurse call back needed:  Yes      Reason for call:  Patient calling to report that he went in for a pre-op for a foot surgery.  Patient states at his pre-op on 04/10/23, his blood pressure was 200-120.  Patient states he went back to the clinic today for another BP check and \"it is just as high\".  Patient has a history of renal artery stenosis.  Patient states he went to the ER on Tuesday, April 11th and \"I took a benzodiazapam and when I arrived at the ER, my BP was 154-74.  The ER doctor gave me a prescription for Valium and it is not working, my blood pressure is still high.  I am having dizziness, loud ringing in ears, very tired and just not feeling well'      Pharmacy location:  N/A  Outside Imaging: N/A   Can we leave a detailed message on this number?  Yes       "

## 2023-04-12 NOTE — TELEPHONE ENCOUNTER
"BP medications managed per Cardiology and appears patient has already been in contact with Dr. Hernández's office.  Reviewed clinic notes and appears patient is awaiting recommendations from Dr. Hernández.    Returned call to patient.  He reports his BP was elevated at his 4/10/23 preop exam (per review of Care Everywhere, it was 180/98).    4/11/23, pt presented to ED with instructions to follow up with PCP and cardiology.  BP readings during visit 153/87 and 148/75.    Pt reports he has a \"full feeling\" in his head.  He denies any loss of vision, change in speech, loss of strength in upper or lower extremities.  Advised him to continue follow up with cardiology to advise on any medication adjustments.      Per review of chart, pt did had carotid US done in 2021 (hx of right CEA 7/26/16),   1. Right side:      Degree of stenosis of the internal carotid artery: There is <50% diameter stenosis of the internal carotid artery by velocity criteria.  2. Left side:       Degree of stenosis of the internal carotid artery: There is <50% diameter stenosis of the internal carotid artery by velocity criteria.    Patient voiced concern if there could be a progression in the carotid stenosis.  Pt is currently scheduled for aortic/iliac US to evaluate AAA and renal US to evaluate renal stents on 6/20/23.  Will discuss with Dr. Yoder if ok to proceed with adding bilateral carotid US onto existing appointments.  Patient was in agreement with this plan and understands he will receive a call from scheduling to help coordinate this.    Loulou Escalona, SHADYN, RN-[facility]    "

## 2023-04-14 ENCOUNTER — TELEPHONE (OUTPATIENT)
Dept: CARDIOLOGY | Facility: CLINIC | Age: 63
End: 2023-04-14
Payer: COMMERCIAL

## 2023-04-14 NOTE — TELEPHONE ENCOUNTER
[facility] Call Center    Phone Message    May a detailed message be left on voicemail: no     Reason for Call: Other: El called to report he has been experiencing elevated B/P. Please reach back out to him at [phone].     Action Taken: Other: [facility]    Travel Screening: Not Applicable

## 2023-04-14 NOTE — TELEPHONE ENCOUNTER
Left voicemail for pt to call back and discuss.     A voicemail was left for pt on 4/12/23 as well, see other triage encounter.

## 2023-04-14 NOTE — TELEPHONE ENCOUNTER
Received call from pt's PCP clinic that pt had another BP check performed today.   BP was 167/99 HR 68 - no med changes were made and reported that they would like pt to be seen urgently by his cardiologist.     Pt last saw Dr. Hernández in May 2022, see previous notes from Dr. Hernández in regards to pt's recent visits with HTN.   Dr. Hernández does not have any openings in the next week.    Soonest available appt with cardiologist is Dr. Troy on 4/17/23.     Scheduled pt to be seen then.   Pt aware and gave verbal understanding.

## 2023-04-17 ENCOUNTER — OFFICE VISIT (OUTPATIENT)
Dept: CARDIOLOGY | Facility: CLINIC | Age: 63
End: 2023-04-17
Payer: COMMERCIAL

## 2023-04-17 VITALS
SYSTOLIC BLOOD PRESSURE: 172 MMHG | BODY MASS INDEX: 25.51 KG/M2 | DIASTOLIC BLOOD PRESSURE: 100 MMHG | HEART RATE: 77 BPM | WEIGHT: 158.7 LBS | HEIGHT: 66 IN

## 2023-04-17 DIAGNOSIS — E78.5 HYPERLIPIDEMIA WITH TARGET LDL LESS THAN 130: Primary | ICD-10-CM

## 2023-04-17 DIAGNOSIS — I10 BENIGN ESSENTIAL HYPERTENSION: ICD-10-CM

## 2023-04-17 PROCEDURE — 99214 OFFICE O/P EST MOD 30 MIN: CPT | Performed by: INTERNAL MEDICINE

## 2023-04-17 RX ORDER — CARVEDILOL 6.25 MG/1
6.25 TABLET ORAL 2 TIMES DAILY WITH MEALS
Qty: 60 TABLET | Refills: 3 | Status: SHIPPED | OUTPATIENT
Start: 2023-04-17 | End: 2023-05-10

## 2023-04-17 RX ORDER — HYDRALAZINE HYDROCHLORIDE 25 MG/1
25 TABLET, FILM COATED ORAL 3 TIMES DAILY
Qty: 90 TABLET | Refills: 3 | Status: SHIPPED | OUTPATIENT
Start: 2023-04-17 | End: 2023-05-10

## 2023-04-17 NOTE — PROGRESS NOTES
CARDIOLOGY CLINIC CONSULTATION    PRIMARY CARE PHYSICIAN:  Lee Dave    Review of the result(s) of each unique test - echo labs ECG stress test CT     The level of medical decision making during this visit was of moderate complexity.    HISTORY OF PRESENT ILLNESS:  This is a 62-year-old very pleasant man who follows up with Dr. Hernández.  The patient is seeing me urgently due to new preoperative concerns.    The patient has a history of carotid artery stenosis status post right endarterectomy, severe coronary artery disease based on calcium score with negative stress testing.  He has a history of hyperlipidemia hypertension.  He has a history of severe renal artery stenosis status post tenting in 2016.    The patient was last seen by Dr. Hernández in 2022.  He was quite stable at that time.  Now he recently had an injury on his right foot and is needing surgery.  He has been noticing over the last 2 to 3 weeks that his blood pressures have been in the 1 60-1 70 systolic range.  He says he is not in any pain.  He is not sure if elevated blood pressures preceded this injury because he has not been checking his blood pressures prior to this event.  In any case orthopedic canceled his surgery.  He was urgently sent to cardiology clinic to discuss this.    The patient denies angina syncope presyncope.  No heart failure symptoms.  As mentioned blood pressure is severely elevated.    The patient has allergies to multiple medications.  He has not tolerated amlodipine carvedilol metoprolol in the past.  He has had muscle aches on statins but currently is tolerating rosuvastatin well.    PAST MEDICAL HISTORY:  Past Medical History:   Diagnosis Date     AAA (abdominal aortic aneurysm) without rupture (H) 12/12/2017    AAA 3.3 cm seen on renal ultrasound     Carotid stenosis, right     s/p right CEA 7/27/16     Coronary artery disease 6-6-16    per Ca+ score = TOTAL CALCIUM SCORE: 1145.9     Gastro-oesophageal reflux  "disease      Hyperlipidemia LDL goal < 130 8/13/14         Hypertension 8/8/14    hospitalized with hypertensive urgency 8/13/14     Lung nodule 6-6-16    Per Ca+ score - soft tissue results. indeterminate 8 mm LUNG NODULE right lung     Near syncope      Renal artery stenosis (H)     s/p PTA & BMS to left renal artery       MEDICATIONS:  Current Outpatient Medications   Medication     acetaminophen (TYLENOL) 325 MG tablet     aspirin 81 MG tablet     carvedilol (COREG) 6.25 MG tablet     Coenzyme Q10 (CO Q 10) 100 MG CAPS     diazepam (VALIUM) 5 MG tablet     ezetimibe (ZETIA) 10 MG tablet     hydrALAZINE (APRESOLINE) 25 MG tablet     irbesartan (AVAPRO) 150 MG tablet     Menaquinone-7 (VITAMIN K2) 100 MCG CAPS     rosuvastatin (CRESTOR) 20 MG tablet     Turmeric 500 MG CAPS     vitamin B complex with vitamin C (STRESS TAB) tablet     No current facility-administered medications for this visit.       ALLERGIES:  Allergies   Allergen Reactions     Simvastatin Muscle Pain (Myalgia)     Ace Inhibitors Cough     Carvedilol      Palpitations, syncope     Clonidine Other (See Comments)     Mental fatigue, somnolence     Labetalol Other (See Comments)     diffuse weakness, muscle cramps, stocking/glove fasiculations, urinary hesitancy, muscle cramps     Metoprolol Other (See Comments)     Severe fatigue, \"made heart jump\"     Amlodipine Other (See Comments)     Palpitations, dizziness       SOCIAL HISTORY:  I have reviewed this patient's social history and updated it with pertinent information if needed. El Conde Daveyon  reports that he quit smoking about 18 years ago. He started smoking about 46 years ago. He has a 27.00 pack-year smoking history. He has never used smokeless tobacco. He reports current alcohol use. He reports that he does not use drugs.    FAMILY HISTORY:  I have reviewed this patient's family history and updated it with pertinent information if needed.   Family History   Problem Relation Age " of Onset     Hypertension Mother      C.A.D. Mother 55        MI and stent age 55     Coronary Artery Disease Mother      Myocardial Infarction Mother      Heart Surgery Mother         stents     Hypertension Father      C.A.D. Father         MI age 55, stents age 55     Coronary Artery Disease Father      Cancer Father      Myocardial Infarction Father      Heart Surgery Father         stents     Neurologic Disorder Sister 30        progressive supranuclear palsy     Hypertension Sister      Hypertension Sister      Heart Surgery Sister         stent     Hypertension Brother      Coronary Artery Disease Maternal Grandmother      Hypertension Maternal Grandmother      Heart Surgery Maternal Grandmother          during surgery     Cancer Maternal Grandfather      Leukemia Maternal Grandfather      Hypertension Paternal Grandmother      Other - See Comments Paternal Grandmother         embolism     Heart Disease Paternal Grandfather        REVIEW OF SYSTEMS:  Skin:        Eyes:       ENT:       Respiratory:       Cardiovascular:       Gastroenterology:      Genitourinary:       Musculoskeletal:       Neurologic:       Psychiatric:       Heme/Lymph/Imm:       Endocrine:           PHYSICAL EXAM:      BP: (!) 172/100 Pulse: 77            Vital Signs with Ranges  Pulse:  [77] 77  BP: (172)/(100) 172/100  158 lbs 11.2 oz    Constitutional: alert, no distress  Respiratory: Good bilateral air entry  Cardiovascular: Regular heart sounds no murmur rubs or gallops no edema faint left carotid bruit  GI: nondistended  Neuropsychiatric: appropriate affact    ASSESSMENT: Pertinent issues addressed/ reviewed during this cardiology visit  Uncontrolled hypertension  Preoperative evaluation  Severe vascular disease    RECOMMENDATIONS:  1. The patient has significantly elevated blood pressure.  Unclear if this is due to recent fracture and hemodynamic stress as a result of that.  I do not think however this is a contraindication to  surgery.  2. He has no angina or heart failure.  3. I recommend changing his propranolol to carvedilol 6.25 twice daily and start hydralazine 25 mg daily.  He is anticipating surgery in the next 1 to 2 weeks.  Given the lack of symptoms, no further ischemic work-up is required at this time.  Patient's functional capacity was greater than 4 METS prior to this leg injury.  4. I would not stop aspirin perioperatively.  5. I recommend that his renal ultrasound and carotid ultrasound soon.  If there is evidence of progressive renal artery stenosis, he will need further vascular work-up.  6. I recommend that he follow-up with his primary cardiologist Dr. Hernández in the next 2 to 4 weeks in clinic to follow-up on blood pressure after these changes that have made.    It was a pleasure seeing this patient in clinic today. Please do not hesitate to contact me with any future questions.     JORGE ALBERTO March, [facility]  Cardiology - [facility]  April 17, 2023    This note was completed in part using dictation via the Dragon voice recognition software. Some word and grammatical errors may occur and must be interpreted in the appropriate clinical context.  If there are any questions pertaining to this issue, please contact me for further clarification.

## 2023-04-17 NOTE — LETTER
4/17/2023    Lee Dave MD  [address] [facility] [address]    RE: El Newell       Dear Colleague,     I had the pleasure of seeing El Alegriaw Reece in the [facility].  CARDIOLOGY CLINIC CONSULTATION    PRIMARY CARE PHYSICIAN:  Lee Dave    Review of the result(s) of each unique test - echo labs ECG stress test CT     The level of medical decision making during this visit was of moderate complexity.    HISTORY OF PRESENT ILLNESS:  This is a 62-year-old very pleasant man who follows up with Dr. Hernández.  The patient is seeing me urgently due to new preoperative concerns.    The patient has a history of carotid artery stenosis status post right endarterectomy, severe coronary artery disease based on calcium score with negative stress testing.  He has a history of hyperlipidemia hypertension.  He has a history of severe renal artery stenosis status post tenting in 2016.    The patient was last seen by Dr. Hernández in 2022.  He was quite stable at that time.  Now he recently had an injury on his right foot and is needing surgery.  He has been noticing over the last 2 to 3 weeks that his blood pressures have been in the 1 60-1 70 systolic range.  He says he is not in any pain.  He is not sure if elevated blood pressures preceded this injury because he has not been checking his blood pressures prior to this event.  In any case orthopedic canceled his surgery.  He was urgently sent to cardiology clinic to discuss this.    The patient denies angina syncope presyncope.  No heart failure symptoms.  As mentioned blood pressure is severely elevated.    The patient has allergies to multiple medications.  He has not tolerated amlodipine carvedilol metoprolol in the past.  He has had muscle aches on statins but currently is tolerating rosuvastatin well.    PAST MEDICAL HISTORY:  Past Medical History:   Diagnosis Date    AAA (abdominal aortic aneurysm) without rupture (H)  "12/12/2017    AAA 3.3 cm seen on renal ultrasound    Carotid stenosis, right     s/p right CEA 7/27/16    Coronary artery disease 6-6-16    per Ca+ score = TOTAL CALCIUM SCORE: 1145.9    Gastro-oesophageal reflux disease     Hyperlipidemia LDL goal < 130 8/13/14        Hypertension 8/8/14    hospitalized with hypertensive urgency 8/13/14    Lung nodule 6-6-16    Per Ca+ score - soft tissue results. indeterminate 8 mm LUNG NODULE right lung    Near syncope     Renal artery stenosis (H)     s/p PTA & BMS to left renal artery       MEDICATIONS:  Current Outpatient Medications   Medication    acetaminophen (TYLENOL) 325 MG tablet    aspirin 81 MG tablet    carvedilol (COREG) 6.25 MG tablet    Coenzyme Q10 (CO Q 10) 100 MG CAPS    diazepam (VALIUM) 5 MG tablet    ezetimibe (ZETIA) 10 MG tablet    hydrALAZINE (APRESOLINE) 25 MG tablet    irbesartan (AVAPRO) 150 MG tablet    Menaquinone-7 (VITAMIN K2) 100 MCG CAPS    rosuvastatin (CRESTOR) 20 MG tablet    Turmeric 500 MG CAPS    vitamin B complex with vitamin C (STRESS TAB) tablet     No current facility-administered medications for this visit.       ALLERGIES:  Allergies   Allergen Reactions    Simvastatin Muscle Pain (Myalgia)    Ace Inhibitors Cough    Carvedilol      Palpitations, syncope    Clonidine Other (See Comments)     Mental fatigue, somnolence    Labetalol Other (See Comments)     diffuse weakness, muscle cramps, stocking/glove fasiculations, urinary hesitancy, muscle cramps    Metoprolol Other (See Comments)     Severe fatigue, \"made heart jump\"    Amlodipine Other (See Comments)     Palpitations, dizziness       SOCIAL HISTORY:  I have reviewed this patient's social history and updated it with pertinent information if needed. El Conde Reece  reports that he quit smoking about 18 years ago. He started smoking about 46 years ago. He has a 27.00 pack-year smoking history. He has never used smokeless tobacco. He reports current alcohol use. He reports " that he does not use drugs.    FAMILY HISTORY:  I have reviewed this patient's family history and updated it with pertinent information if needed.   Family History   Problem Relation Age of Onset    Hypertension Mother     C.A.D. Mother 55        MI and stent age 55    Coronary Artery Disease Mother     Myocardial Infarction Mother     Heart Surgery Mother         stents    Hypertension Father     C.A.D. Father         MI age 55, stents age 55    Coronary Artery Disease Father     Cancer Father     Myocardial Infarction Father     Heart Surgery Father         stents    Neurologic Disorder Sister 30        progressive supranuclear palsy    Hypertension Sister     Hypertension Sister     Heart Surgery Sister         stent    Hypertension Brother     Coronary Artery Disease Maternal Grandmother     Hypertension Maternal Grandmother     Heart Surgery Maternal Grandmother          during surgery    Cancer Maternal Grandfather     Leukemia Maternal Grandfather     Hypertension Paternal Grandmother     Other - See Comments Paternal Grandmother         embolism    Heart Disease Paternal Grandfather        REVIEW OF SYSTEMS:  Skin:        Eyes:       ENT:       Respiratory:       Cardiovascular:       Gastroenterology:      Genitourinary:       Musculoskeletal:       Neurologic:       Psychiatric:       Heme/Lymph/Imm:       Endocrine:           PHYSICAL EXAM:      BP: (!) 172/100 Pulse: 77            Vital Signs with Ranges  Pulse:  [77] 77  BP: (172)/(100) 172/100  158 lbs 11.2 oz    Constitutional: alert, no distress  Respiratory: Good bilateral air entry  Cardiovascular: Regular heart sounds no murmur rubs or gallops no edema faint left carotid bruit  GI: nondistended  Neuropsychiatric: appropriate affact    ASSESSMENT: Pertinent issues addressed/ reviewed during this cardiology visit  Uncontrolled hypertension  Preoperative evaluation  Severe vascular disease    RECOMMENDATIONS:  The patient has significantly  elevated blood pressure.  Unclear if this is due to recent fracture and hemodynamic stress as a result of that.  I do not think however this is a contraindication to surgery.  He has no angina or heart failure.  I recommend changing his propranolol to carvedilol 6.25 twice daily and start hydralazine 25 mg daily.  He is anticipating surgery in the next 1 to 2 weeks.  Given the lack of symptoms, no further ischemic work-up is required at this time.  Patient's functional capacity was greater than 4 METS prior to this leg injury.  I would not stop aspirin perioperatively.  I recommend that his renal ultrasound and carotid ultrasound soon.  If there is evidence of progressive renal artery stenosis, he will need further vascular work-up.  I recommend that he follow-up with his primary cardiologist Dr. Hernández in the next 2 to 4 weeks in clinic to follow-up on blood pressure after these changes that have made.    It was a pleasure seeing this patient in clinic today. Please do not hesitate to contact me with any future questions.     JORGE ALBERTO March, [facility]  Cardiology - [facility]  April 17, 2023    This note was completed in part using dictation via the Dragon voice recognition software. Some word and grammatical errors may occur and must be interpreted in the appropriate clinical context.  If there are any questions pertaining to this issue, please contact me for further clarification.      Thank you for allowing me to participate in the care of your patient.      Sincerely,     Estevan Sadler MD     [facility]  cc:   No referring provider defined for this encounter.

## 2023-04-17 NOTE — TELEPHONE ENCOUNTER
Per Dr. Yoder [facility] bilateral carotid US to be completed at time of already scheduled follow up.    Order entered.    Routing to scheduling to contact patient to coordinate bilateral carotid US with the already scheduled appointments below:    Future Appointments   Date Time Provider Department Center   6/20/2023  8:00 AM [address]   6/20/2023  8:45 AM [address]   6/20/2023 10:00 AM Vaibhav Yoder MD [facility]     VERONICA Lugo, RN-[facility] [place]

## 2023-04-18 ENCOUNTER — CARE COORDINATION (OUTPATIENT)
Dept: CARDIOLOGY | Facility: CLINIC | Age: 63
End: 2023-04-18
Payer: COMMERCIAL

## 2023-04-18 NOTE — PROGRESS NOTES
Estevan Sadler MD  [facility] Heart Team 7  [facility] Preston Alcocer     I reviewed most recent ortho note in [facility]ARE EVERYWHERE for  and found a fax number of [phone]. Will right fax 's OV note. I sent pt a my chart update that note has been faxed. Records should also be viewable in CARE EVERYWHERE for . Giacomo FOUNTAIN April 18, 2023, 1:58 PM

## 2023-04-26 NOTE — PROGRESS NOTES
[place] [facility]    El Newell returns for vascular follow-up.  7/26/2016 right CEA for 90% stenosis.  Widely patent on 4/27/2021 duplex with mild disease of the left carotid.    Also history of renovascular hypertension treated with left renal stent.  Widely patent stent on 3/29/2022 and duplex.      PMH: Medications: Coreg, Avapro, Apresoline, Crestor, Zetia, aspirin     Medical: Hypertension    Hyperlipidemia on statin-no recent LDL in epic.  Patient reports     Well-controlled LDL and elevated HDL    GERD    Asymptomatic AAA with aorta measuring 3.17 x 3.30 cm    SCr=0.95    A1c= 5.2     Non-smoker.  Lives independently.    Abdominal ultrasound performed earlier today: Aorta measuring 3.17 x 3.32 cm        Renal artery ultrasound: Patent left renal stent with minimal diameter 3.9 mm mid stent, 4.9 mm in proximal stented 6 mm in distal stent.  Previous duplex with measurements of 8.6 mm proximally, 6.9 mm mid, 5.5 mm distal..            Minimal right renal artery stenosis    Slightly atrophic left kidney measuring 8.6 x 5.8 x 5.3 cm     Right kidney measuring 11.3 x 6.78 x 5.01 cm      Carotid duplex: Widely patent right CEA with no recurrent stenosis.  Stable mild calcified plaque stenosis of left carotid with ICA PSV= 132 cm/s.        TELEPHONE CONSULTATION: Patient had his testing performed earlier this morning but decided he wanted to go home and have a telephone consult with me from the clinic.  He had recently broken his foot and required ORIF and is on a knee scooter and thus wanted to get home so he could elevate his leg.    He is reports he is doing very well.  Blood pressure has been consistent in the range of 130/80 with his present blood pressure medications.    We discussed the ultrasound findings.  Left stent is patent though diameter is somewhat decreased though still very adequate.  Decrease in the left size measuring 10 cm on study last year.  Mild change in the right  kidney.  With his well-controlled blood pressure and normal renal function no interventions indicated.  We will repeat duplex ultrasound in 1 years.    Widely patent right CEA with mild stable changes in the left.  Repeat exam in 2 years.    Unchanged infrarenal small AAA.      IMPRESSION: #1.  Some evidence of narrowing the left renal stent but still very adequate blood flow.  Slight decrease in cortex size on the left but little can be done about this.  Due to these changes we will check in 1 year.     #2.  Widely patent right CEA with minimal changes in the left-duplex in 2 years     #3.   Small stable asymptomatic infrarenal AAA.-Duplex in 2 years      20 minutes on phone call today with patient reviewing results being completed at 1215 hrs.    Vaibhav Yoder MD   This note was created using Dragon voice recognition software which may result in transcription errors.

## 2023-04-27 ENCOUNTER — VIRTUAL VISIT (OUTPATIENT)
Dept: OTHER | Facility: CLINIC | Age: 63
End: 2023-04-27
Attending: SURGERY
Payer: COMMERCIAL

## 2023-04-27 ENCOUNTER — HOSPITAL ENCOUNTER (OUTPATIENT)
Dept: ULTRASOUND IMAGING | Facility: CLINIC | Age: 63
Discharge: HOME | End: 2023-04-27
Attending: SURGERY
Payer: COMMERCIAL

## 2023-04-27 DIAGNOSIS — I71.40 ABDOMINAL AORTIC ANEURYSM (AAA) 3.0 CM TO 5.5 CM IN DIAMETER IN MALE (H): ICD-10-CM

## 2023-04-27 DIAGNOSIS — Z98.890 HISTORY OF RIGHT-SIDED CAROTID ENDARTERECTOMY: ICD-10-CM

## 2023-04-27 DIAGNOSIS — E78.5 HYPERLIPIDEMIA LDL GOAL <70: ICD-10-CM

## 2023-04-27 DIAGNOSIS — I15.0 RENOVASCULAR HYPERTENSION: ICD-10-CM

## 2023-04-27 DIAGNOSIS — I15.0 RENOVASCULAR HYPERTENSION: Primary | ICD-10-CM

## 2023-04-27 DIAGNOSIS — I71.43 INFRARENAL ABDOMINAL AORTIC ANEURYSM (AAA) WITHOUT RUPTURE (H): ICD-10-CM

## 2023-04-27 PROCEDURE — 93978 VASCULAR STUDY: CPT | Mod: XU

## 2023-04-27 PROCEDURE — 93975 VASCULAR STUDY: CPT

## 2023-04-27 PROCEDURE — 93880 EXTRACRANIAL BILAT STUDY: CPT

## 2023-04-27 PROCEDURE — 99213 OFFICE O/P EST LOW 20 MIN: CPT | Mod: 95 | Performed by: SURGERY

## 2023-04-27 PROCEDURE — G0463 HOSPITAL OUTPT CLINIC VISIT: HCPCS | Mod: 25,TEL

## 2023-04-27 NOTE — NURSING NOTE
"El Newell is a 62 year old male who presents for:  Chief Complaint   Patient presents with     RECHECK     (Ph. [phone]) AAA, Nicholas carotid and Nicholas Renal artery US (VHC9:45; WRO 1:45) *History of 3.3cm AAA; left renal artery stenting 8/12/16; 1 year follow up to 4/7/22 appointment with Dr. Yoder.        Vitals:    There were no vitals filed for this visit.    BMI:  Estimated body mass index is 25.61 kg/m  as calculated from the following:    Height as of 4/17/23: 5' 6\" (1.676 m).    Weight as of 4/17/23: 158 lb 11.2 oz (72 kg).    Pain Score:  No Pain (0)        Carrol Zhao RN      "

## 2023-05-04 ENCOUNTER — MYC MEDICAL ADVICE (OUTPATIENT)
Dept: CARDIOLOGY | Facility: CLINIC | Age: 63
End: 2023-05-04
Payer: COMMERCIAL

## 2023-05-04 DIAGNOSIS — R35.0 URINARY FREQUENCY: Primary | ICD-10-CM

## 2023-05-04 NOTE — TELEPHONE ENCOUNTER
Sure we can add UA and see how he feels. He should follow up with PCP and CYN for HTN please. Thanks, K

## 2023-05-04 NOTE — TELEPHONE ENCOUNTER
Pt sent my chart reporting increased urination since starting coreg and hydralazine at his 4/17/23 visit with  for high BP. Pt has FLP lab scheduled for 5/9, and visit with  5/10. He wants to know if any additional labs or urine testing are needed at that time. I will update . Giacomo FOUNTAIN May 4, 2023, 3:01 PM            El Ray [facility] Heart Team 1 (supporting Apolinar Hernández MD) 3 hours ago (11:36 AM)     PE  I was recently placed on carvedilol and hydralazine recently by your collegue at at an urgent visit. Since then I have been urinating much more frequently. Both of those new meds can have diuretic effects and carvedilol can cause hyperglycemia. I was wondering if we should add a U/A and fasting GLC to the labs.     Thanks!     El Newell

## 2023-05-08 DIAGNOSIS — I10 BENIGN ESSENTIAL HYPERTENSION: ICD-10-CM

## 2023-05-08 DIAGNOSIS — I25.10 CORONARY ARTERY DISEASE INVOLVING NATIVE CORONARY ARTERY OF NATIVE HEART WITHOUT ANGINA PECTORIS: Primary | ICD-10-CM

## 2023-05-08 DIAGNOSIS — E78.5 HYPERLIPIDEMIA LDL GOAL <70: ICD-10-CM

## 2023-05-08 DIAGNOSIS — E78.5 HYPERLIPIDEMIA WITH TARGET LDL LESS THAN 130: ICD-10-CM

## 2023-05-09 ENCOUNTER — LAB (OUTPATIENT)
Dept: LAB | Facility: CLINIC | Age: 63
End: 2023-05-09
Payer: COMMERCIAL

## 2023-05-09 DIAGNOSIS — E78.5 HYPERLIPIDEMIA LDL GOAL <70: ICD-10-CM

## 2023-05-09 DIAGNOSIS — I25.10 CORONARY ARTERY DISEASE INVOLVING NATIVE CORONARY ARTERY OF NATIVE HEART WITHOUT ANGINA PECTORIS: ICD-10-CM

## 2023-05-09 DIAGNOSIS — I10 BENIGN ESSENTIAL HYPERTENSION: ICD-10-CM

## 2023-05-09 LAB
ALBUMIN SERPL BCG-MCNC: 4.3 G/DL (ref 3.5–5.2)
ALP SERPL-CCNC: 79 U/L (ref 40–129)
ALT SERPL W P-5'-P-CCNC: 37 U/L (ref 10–50)
ANION GAP SERPL CALCULATED.3IONS-SCNC: 12 MMOL/L (ref 7–15)
AST SERPL W P-5'-P-CCNC: 30 U/L (ref 10–50)
BILIRUB SERPL-MCNC: 0.7 MG/DL
BUN SERPL-MCNC: 9.3 MG/DL (ref 8–23)
CALCIUM SERPL-MCNC: 9.6 MG/DL (ref 8.8–10.2)
CHLORIDE SERPL-SCNC: 102 MMOL/L (ref 98–107)
CHOLEST SERPL-MCNC: 162 MG/DL
CREAT SERPL-MCNC: 0.89 MG/DL (ref 0.67–1.17)
DEPRECATED HCO3 PLAS-SCNC: 23 MMOL/L (ref 22–29)
GFR SERPL CREATININE-BSD FRML MDRD: >90 ML/MIN/1.73M2
GLUCOSE SERPL-MCNC: 105 MG/DL (ref 70–99)
HDLC SERPL-MCNC: 78 MG/DL
LDLC SERPL CALC-MCNC: 71 MG/DL
NONHDLC SERPL-MCNC: 84 MG/DL
POTASSIUM SERPL-SCNC: 4.2 MMOL/L (ref 3.4–5.3)
PROT SERPL-MCNC: 6.9 G/DL (ref 6.4–8.3)
SODIUM SERPL-SCNC: 137 MMOL/L (ref 136–145)
TRIGL SERPL-MCNC: 67 MG/DL

## 2023-05-09 PROCEDURE — 80061 LIPID PANEL: CPT | Performed by: INTERNAL MEDICINE

## 2023-05-09 PROCEDURE — 36415 COLL VENOUS BLD VENIPUNCTURE: CPT | Performed by: INTERNAL MEDICINE

## 2023-05-09 PROCEDURE — 80053 COMPREHEN METABOLIC PANEL: CPT | Performed by: INTERNAL MEDICINE

## 2023-05-10 ENCOUNTER — OFFICE VISIT (OUTPATIENT)
Dept: CARDIOLOGY | Facility: CLINIC | Age: 63
End: 2023-05-10
Payer: COMMERCIAL

## 2023-05-10 VITALS
SYSTOLIC BLOOD PRESSURE: 150 MMHG | WEIGHT: 156.2 LBS | HEART RATE: 66 BPM | DIASTOLIC BLOOD PRESSURE: 85 MMHG | BODY MASS INDEX: 25.1 KG/M2 | HEIGHT: 66 IN

## 2023-05-10 DIAGNOSIS — I25.10 CORONARY ARTERY DISEASE INVOLVING NATIVE CORONARY ARTERY OF NATIVE HEART WITHOUT ANGINA PECTORIS: ICD-10-CM

## 2023-05-10 DIAGNOSIS — E78.5 HYPERLIPIDEMIA WITH TARGET LDL LESS THAN 130: ICD-10-CM

## 2023-05-10 DIAGNOSIS — I70.1 RENAL ARTERY STENOSIS (H): Primary | ICD-10-CM

## 2023-05-10 DIAGNOSIS — I71.43 INFRARENAL ABDOMINAL AORTIC ANEURYSM (AAA) WITHOUT RUPTURE (H): ICD-10-CM

## 2023-05-10 DIAGNOSIS — I10 BENIGN ESSENTIAL HYPERTENSION: ICD-10-CM

## 2023-05-10 DIAGNOSIS — I15.0 RENOVASCULAR HYPERTENSION: ICD-10-CM

## 2023-05-10 DIAGNOSIS — E78.5 HYPERLIPIDEMIA LDL GOAL <70: ICD-10-CM

## 2023-05-10 DIAGNOSIS — I65.23 BILATERAL CAROTID ARTERY STENOSIS: ICD-10-CM

## 2023-05-10 PROCEDURE — 99214 OFFICE O/P EST MOD 30 MIN: CPT | Performed by: INTERNAL MEDICINE

## 2023-05-10 RX ORDER — HYDRALAZINE HYDROCHLORIDE 50 MG/1
50 TABLET, FILM COATED ORAL 3 TIMES DAILY
Qty: 270 TABLET | Refills: 3 | Status: SHIPPED | OUTPATIENT
Start: 2023-05-10 | End: 2024-04-09

## 2023-05-10 RX ORDER — CARVEDILOL 6.25 MG/1
6.25 TABLET ORAL 2 TIMES DAILY WITH MEALS
Qty: 180 TABLET | Refills: 3 | Status: SHIPPED | OUTPATIENT
Start: 2023-05-10 | End: 2024-04-09

## 2023-05-10 NOTE — LETTER
5/10/2023    Yvonne Smith DO, DO  [address] [place] [address]    RE: El Newell       Dear Colleague,     I had the pleasure of seeing El Newell in the [facility].  CARDIOLOGY CLINIC FOLLOW-UP NOTE      REASON FOR VISIT:   Follow-up CAD, renal artery stenosis s/p stenting    PRIMARY CARE PHYSICIAN:  Yvonne Smith DO        History of Present Illness   El Newell is an extremely pleasant 63 year old male, previously a patient of Dr. Thompson until his [facility], here for routine follow-up.  Briefly, he has a history of abnormal coronary CTA around 2016 showing evidence of calcified plaque in multiple vessels.  This was then followed by an exercise MPI where he exercised for 15 minutes and achieved 17 METS without developing any symptoms and with normal perfusion at rest and stress, so he did not go for invasive coronary angiography.  He also has dealt with resistant hypertension and underwent successful stenting of the left renal artery by Dr. Faulkner in 2016, severe right carotid atherosclerosis s/p successful right CEA in 2016, hyperlipidemia, and former tobacco abuse.    When I last saw him in May 2022, he had been doing very well from a cardiac standpoint, continuing to be extremely physically active, doing heavy bodybuilding exercises 3 days/week and cardio 2 days/week.  Unfortunately, he had a recent right foot injury that required surgery, and he saw my colleague, Dr. Sadler, on 4/17/2023 for preoperative assessment.  He was cleared for surgery and tells me he underwent surgery without issue and his foot is beginning to heal well, though he is still in the boot.  The other issue they had addressed, however, was his blood pressure, which previously had been well controlled since left renal artery stenting, but now was extremely elevated in the 170s.  It was thought that this may still be somewhat related to pain, but Dr. Sadler did start him on hydralazine 25  mg 3 times daily, and also switched him from propranolol to carvedilol 6.25 mg twice daily.  With these changes and surgical repair of his foot, his blood pressure has somewhat improved, but is still elevated today at 150/85.  He tells me that he continues to take all of his medications without missing doses and has not changed his extremely healthy diet, though obviously he is not exercising as much at present.  Finally, he did have ultrasounds of his carotids, renal arteries, and abdominal aorta on April 27, 2023.  His abdominal aortic measurements were essentially unchanged, with his suprarenal abdominal aorta measuring 3.3 x 3.2 cm.  His bilateral carotid ultrasound showed less than 50% right ICA stenosis but 50 to 69% left ICA stenosis, with the left-sided velocities increased compared to prior.  Finally, his renal duplex ultrasound showed elevated velocities at the ostium of the right renal artery with a peak systolic velocity of 356 cm/s.  His left renal artery stent was patent, however the velocities there were elevated compared to prior as well, up to 204 cm/s.    His most recent labs are from 5/9/2023, showing total cholesterol 162, HDL 78, LDL 71, triglycerides 67, normal sodium and potassium, normal renal function, and normal ALT.        Assessment & Plan     Renovascular hypertension, much improved following left renal artery stenting in 2016, with recent worsening and suggestion of right ostial renal artery stenosis on 4/2023 renal duplex ultrasound  Asymptomatic CAD by CT scan  Carotid artery disease s/p right CEA in 2016  Mild right, moderate left carotid stenosis on 4/2023 duplex ultrasound  Mild infrarenal abdominal aortic aneurysm (3.2 x 3.3 cm in 4/2023)  Hyperlipidemia, reasonably well controlled  Former tobacco abuse      It was a pleasure to meet with El again in clinic today.  I am glad to hear that he is healing well from his surgery.  His blood pressure is better controlled today with the  "adjustments made by Dr. Sadler last month, but is still suboptimally controlled.  We will increase his hydralazine from 25 mg 3 times daily to 50 mg 3 times daily.  However, the primary  appears to be his potential new right renal artery stenosis.  I will order a CTA of the abdomen to investigate this, and refer him back to see Dr. Faulkner (who did his 2016 left renal artery stent) to discuss this result.      -CTA angiogram to investigate renal artery stenosis  -Refer to Dr. Faulkner as above  -Continue aspirin 81 mg daily  -Continue Crestor 20 mg daily and Zetia 10 mg daily  -Continue irbesartan 150 mg twice daily and carvedilol 6.25 mg twice daily  -Increase hydralazine from 25 mg 3 times daily to 50 mg 3 times daily  -Next carotid duplex ultrasound due in 2025  -Next abdominal ultrasound due in 2025      Follow-up: With Dr. Faulkner as above.        Apolinar Hernández MD  Interventional Cardiology  May 10, 2023        Medications   Current Outpatient Medications   Medication    acetaminophen (TYLENOL) 325 MG tablet    aspirin 81 MG tablet    carvedilol (COREG) 6.25 MG tablet    Coenzyme Q10 (CO Q 10) 100 MG CAPS    diazepam (VALIUM) 5 MG tablet    ezetimibe (ZETIA) 10 MG tablet    hydrALAZINE (APRESOLINE) 25 MG tablet    irbesartan (AVAPRO) 150 MG tablet    Menaquinone-7 (VITAMIN K2) 100 MCG CAPS    rosuvastatin (CRESTOR) 20 MG tablet    Turmeric 500 MG CAPS    vitamin B complex with vitamin C (STRESS TAB) tablet     No current facility-administered medications for this visit.     Allergies   Allergies   Allergen Reactions    Simvastatin Muscle Pain (Myalgia)    Ace Inhibitors Cough    Carvedilol      Palpitations, syncope    Clonidine Other (See Comments)     Mental fatigue, somnolence    Labetalol Other (See Comments)     diffuse weakness, muscle cramps, stocking/glove fasiculations, urinary hesitancy, muscle cramps    Metoprolol Other (See Comments)     Severe fatigue, \"made heart jump\"    Amlodipine Other (See " Comments)     Palpitations, dizziness         Physical Exam       BP: (!) 150/85 Pulse: 66            Vital Signs with Ranges  Pulse:  [66] 66  BP: (150)/(85) 150/85  156 lbs 3.2 oz    Constitutional: Well-appearing, no acute distress  Respiratory: Normal respiratory effort, CTAB  Cardiovascular: RRR, faint early peaking systolic murmur at the base.  JVP < 7 cm H2O.  There is no LE edema.  Normal carotid upstrokes, no carotid bruits.        Thank you for allowing me to participate in the care of your patient.      Sincerely,     Apolinar Hernández MD     [facility]  cc:   Estevan Sadler MD  [address] [facility] [address]  [place] [id number]

## 2023-05-10 NOTE — PROGRESS NOTES
CARDIOLOGY CLINIC FOLLOW-UP NOTE      REASON FOR VISIT:   Follow-up CAD, renal artery stenosis s/p stenting    PRIMARY CARE PHYSICIAN:  Yvonne Smith, DO        History of Present Illness   El Newell is an extremely pleasant 63 year old male, previously a patient of Dr. Thompson until his jail, here for routine follow-up.  Briefly, he has a history of abnormal coronary CTA around 2016 showing evidence of calcified plaque in multiple vessels.  This was then followed by an exercise MPI where he exercised for 15 minutes and achieved 17 METS without developing any symptoms and with normal perfusion at rest and stress, so he did not go for invasive coronary angiography.  He also has dealt with resistant hypertension and underwent successful stenting of the left renal artery by Dr. Faulkenr in 2016, severe right carotid atherosclerosis s/p successful right CEA in 2016, hyperlipidemia, and former tobacco abuse.    When I last saw him in May 2022, he had been doing very well from a cardiac standpoint, continuing to be extremely physically active, doing heavy bodybuilding exercises 3 days/week and cardio 2 days/week.  Unfortunately, he had a recent right foot injury that required surgery, and he saw my colleague, Dr. Sadler, on 4/17/2023 for preoperative assessment.  He was cleared for surgery and tells me he underwent surgery without issue and his foot is beginning to heal well, though he is still in the boot.  The other issue they had addressed, however, was his blood pressure, which previously had been well controlled since left renal artery stenting, but now was extremely elevated in the 170s.  It was thought that this may still be somewhat related to pain, but Dr. Sadler did start him on hydralazine 25 mg 3 times daily, and also switched him from propranolol to carvedilol 6.25 mg twice daily.  With these changes and surgical repair of his foot, his blood pressure has somewhat improved, but is still elevated  today at 150/85.  He tells me that he continues to take all of his medications without missing doses and has not changed his extremely healthy diet, though obviously he is not exercising as much at present.  Finally, he did have ultrasounds of his carotids, renal arteries, and abdominal aorta on April 27, 2023.  His abdominal aortic measurements were essentially unchanged, with his suprarenal abdominal aorta measuring 3.3 x 3.2 cm.  His bilateral carotid ultrasound showed less than 50% right ICA stenosis but 50 to 69% left ICA stenosis, with the left-sided velocities increased compared to prior.  Finally, his renal duplex ultrasound showed elevated velocities at the ostium of the right renal artery with a peak systolic velocity of 356 cm/s.  His left renal artery stent was patent, however the velocities there were elevated compared to prior as well, up to 204 cm/s.    His most recent labs are from 5/9/2023, showing total cholesterol 162, HDL 78, LDL 71, triglycerides 67, normal sodium and potassium, normal renal function, and normal ALT.        Assessment & Plan     1. Renovascular hypertension, much improved following left renal artery stenting in 2016, with recent worsening and suggestion of right ostial renal artery stenosis on 4/2023 renal duplex ultrasound  2. Asymptomatic CAD by CT scan  3. Carotid artery disease s/p right CEA in 2016  a. Mild right, moderate left carotid stenosis on 4/2023 duplex ultrasound  4. Mild infrarenal abdominal aortic aneurysm (3.2 x 3.3 cm in 4/2023)  5. Hyperlipidemia, reasonably well controlled  6. Former tobacco abuse      It was a pleasure to meet with El again in clinic today.  I am glad to hear that he is healing well from his surgery.  His blood pressure is better controlled today with the adjustments made by Dr. Sadler last month, but is still suboptimally controlled.  We will increase his hydralazine from 25 mg 3 times daily to 50 mg 3 times daily.  However, the primary  " appears to be his potential new right renal artery stenosis.  I will order a CTA of the abdomen to investigate this, and refer him back to see Dr. Faulkner (who did his 2016 left renal artery stent) to discuss this result.      -CTA angiogram to investigate renal artery stenosis  -Refer to Dr. Faulkner as above  -Continue aspirin 81 mg daily  -Continue Crestor 20 mg daily and Zetia 10 mg daily  -Continue irbesartan 150 mg twice daily and carvedilol 6.25 mg twice daily  -Increase hydralazine from 25 mg 3 times daily to 50 mg 3 times daily  -Next carotid duplex ultrasound due in 2025  -Next abdominal ultrasound due in 2025      Follow-up: With Dr. Faulkner as above.        Apolinar Hernández MD  Interventional Cardiology  May 10, 2023        Medications   Current Outpatient Medications   Medication     acetaminophen (TYLENOL) 325 MG tablet     aspirin 81 MG tablet     carvedilol (COREG) 6.25 MG tablet     Coenzyme Q10 (CO Q 10) 100 MG CAPS     diazepam (VALIUM) 5 MG tablet     ezetimibe (ZETIA) 10 MG tablet     hydrALAZINE (APRESOLINE) 25 MG tablet     irbesartan (AVAPRO) 150 MG tablet     Menaquinone-7 (VITAMIN K2) 100 MCG CAPS     rosuvastatin (CRESTOR) 20 MG tablet     Turmeric 500 MG CAPS     vitamin B complex with vitamin C (STRESS TAB) tablet     No current facility-administered medications for this visit.     Allergies   Allergies   Allergen Reactions     Simvastatin Muscle Pain (Myalgia)     Ace Inhibitors Cough     Carvedilol      Palpitations, syncope     Clonidine Other (See Comments)     Mental fatigue, somnolence     Labetalol Other (See Comments)     diffuse weakness, muscle cramps, stocking/glove fasiculations, urinary hesitancy, muscle cramps     Metoprolol Other (See Comments)     Severe fatigue, \"made heart jump\"     Amlodipine Other (See Comments)     Palpitations, dizziness         Physical Exam       BP: (!) 150/85 Pulse: 66            Vital Signs with Ranges  Pulse:  [66] 66  BP: (150)/(85) " 150/85  156 lbs 3.2 oz    Constitutional: Well-appearing, no acute distress  Respiratory: Normal respiratory effort, CTAB  Cardiovascular: RRR, faint early peaking systolic murmur at the base.  JVP < 7 cm H2O.  There is no LE edema.  Normal carotid upstrokes, no carotid bruits.

## 2023-05-16 ENCOUNTER — HOSPITAL ENCOUNTER (OUTPATIENT)
Dept: CT IMAGING | Facility: CLINIC | Age: 63
Discharge: HOME | End: 2023-05-16
Attending: INTERNAL MEDICINE | Admitting: INTERNAL MEDICINE
Payer: COMMERCIAL

## 2023-05-16 DIAGNOSIS — I70.1 RENAL ARTERY STENOSIS (H): ICD-10-CM

## 2023-05-16 PROCEDURE — 250N000009 HC RX 250: Performed by: INTERNAL MEDICINE

## 2023-05-16 PROCEDURE — 250N000011 HC RX IP 250 OP 636: Performed by: INTERNAL MEDICINE

## 2023-05-16 PROCEDURE — 74175 CTA ABDOMEN W/CONTRAST: CPT

## 2023-05-16 RX ORDER — IOPAMIDOL 755 MG/ML
80 INJECTION, SOLUTION INTRAVASCULAR ONCE
Status: COMPLETED | OUTPATIENT
Start: 2023-05-16 | End: 2023-05-16

## 2023-05-16 RX ADMIN — IOPAMIDOL 80 ML: 755 INJECTION, SOLUTION INTRAVENOUS at 08:30

## 2023-05-16 RX ADMIN — SODIUM CHLORIDE 80 ML: 9 INJECTION, SOLUTION INTRAVENOUS at 08:30

## 2023-06-28 ENCOUNTER — VIRTUAL VISIT (OUTPATIENT)
Dept: CARDIOLOGY | Facility: CLINIC | Age: 63
End: 2023-06-28
Payer: COMMERCIAL

## 2023-06-28 DIAGNOSIS — I70.1 RENAL ARTERY STENOSIS (H): ICD-10-CM

## 2023-06-28 PROCEDURE — 99214 OFFICE O/P EST MOD 30 MIN: CPT | Mod: VID | Performed by: INTERNAL MEDICINE

## 2023-06-28 NOTE — PROGRESS NOTES
El is a 63 year old who is being evaluated via a billable video visit.      How would you like to obtain your AVS? Mail a copy  If the video visit is dropped, the invitation should be resent by: Text to cell phone: [phone]  Will anyone else be joining your video visit? No       Review Of Systems    Respiratory: No shortness of breath, dyspnea on exertion, cough, or hemoptysis  Cardiovascular: negative      Patient reported vitals:  BP: 130/74  Heart rate: N/A  Weight: 158lb    LUTHER Morales CMA        Video-Visit Details    Type of service:  Video Visit     Originating Location (pt. Location): Home         REASON FOR VIDEO VISIT: Renovascular hypertension status post prior left renal artery stenting    HISTORY OF PRESENT ILLNESS: The patient is a very pleasant 63-year-old gentleman with history of renovascular hypertension status post prior left renal artery stenting who is here for further evaluation.  He developed resistant hypertension in 2016.  He underwent renal artery ultrasound at that time which suggested severe left renal artery stenosis.  He subsequent proceeded to have bilateral renal angiography which revealed no significant stenosis of the right but high-grade stenosis involving the origin of the left renal artery.  The left renal artery was successfully stented.  His blood pressure became well controlled after his stenting procedure.    He suffered a right foot injury in the spring which required surgery.  After that injury the patient's blood pressure became elevated again.  He was having significant mount of pain.  His antihypertensive regimen was escalated.  It was also refilled for renal artery ultrasound which I personally reviewed.  The ultrasound showed patent left renal artery stent and elevated velocities at the origin of the right renal artery concerning for significant stenosis.  The ultrasound also suggested size discrepancy between the left and right renal arteries.  He  subsequently proceeded to have CTA of the renal arteries.  CTA which I also personally reviewed showed no evidence of right renal artery stenosis.  The left renal artery stent is widely patent.  Also do not appreciate significant size discrepancy between the 2 kidneys on the CT scan.    ASSESSMENT AND PLAN: A very pleasant 63-year-old gentleman with history of renovascular hypertension status post prior left renal artery revascularization.  I reviewed his recent CTA and renal artery ultrasound findings with him.  Both tests showed patent left renal artery stent.  No significant stenosis in the right renal artery per the CT scan as well.    The recently elevated blood pressures were likely pain related.  His blood pressure is now well controlled with the current regimen.  He feels great overall.  Given well-controlled blood pressure, and the results of the aforementioned noninvasive imaging, I do not see an indication to pursue additional investigation at this point.  Recommend repeat surveillance renal artery ultrasound in 1 year.    I appreciate the opportunity to participate in his care.      Faisal Faulkner MD  Total time spent on this visit was 30 minutes including talking to the patient, and reviewing imaging or other medical records.

## 2023-06-28 NOTE — LETTER
6/28/2023    Yvonne Smith, , DO  [address] [address]    RE: El Newell       Dear Colleague,     I had the pleasure of seeing El Newell in the [facility].  El is a 63 year old who is being evaluated via a billable video visit.      How would you like to obtain your AVS? Mail a copy  If the video visit is dropped, the invitation should be resent by: Text to cell phone: [phone]  Will anyone else be joining your video visit? No       Review Of Systems    Respiratory: No shortness of breath, dyspnea on exertion, cough, or hemoptysis  Cardiovascular: negative      Patient reported vitals:  BP: 130/74  Heart rate: N/A  Weight: 158lb    LUTHER Morales CMA        Video-Visit Details    Type of service:  Video Visit     Originating Location (pt. Location): Home         REASON FOR VIDEO VISIT: Renovascular hypertension status post prior left renal artery stenting    HISTORY OF PRESENT ILLNESS: The patient is a very pleasant 63-year-old gentleman with history of renovascular hypertension status post prior left renal artery stenting who is here for further evaluation.  He developed resistant hypertension in 2016.  He underwent renal artery ultrasound at that time which suggested severe left renal artery stenosis.  He subsequent proceeded to have bilateral renal angiography which revealed no significant stenosis of the right but high-grade stenosis involving the origin of the left renal artery.  The left renal artery was successfully stented.  His blood pressure became well controlled after his stenting procedure.    He suffered a right foot injury in the spring which required surgery.  After that injury the patient's blood pressure became elevated again.  He was having significant mount of pain.  His antihypertensive regimen was escalated.  It was also refilled for renal artery ultrasound which I personally reviewed.  The ultrasound showed patent left renal  artery stent and elevated velocities at the origin of the right renal artery concerning for significant stenosis.  The ultrasound also suggested size discrepancy between the left and right renal arteries.  He subsequently proceeded to have CTA of the renal arteries.  CTA which I also personally reviewed showed no evidence of right renal artery stenosis.  The left renal artery stent is widely patent.  Also do not appreciate significant size discrepancy between the 2 kidneys on the CT scan.    ASSESSMENT AND PLAN: A very pleasant 63-year-old gentleman with history of renovascular hypertension status post prior left renal artery revascularization.  I reviewed his recent CTA and renal artery ultrasound findings with him.  Both tests showed patent left renal artery stent.  No significant stenosis in the right renal artery per the CT scan as well.    The recently elevated blood pressures were likely pain related.  His blood pressure is now well controlled with the current regimen.  He feels great overall.  Given well-controlled blood pressure, and the results of the aforementioned noninvasive imaging, I do not see an indication to pursue additional investigation at this point.  Recommend repeat surveillance renal artery ultrasound in 1 year.    I appreciate the opportunity to participate in his care.      Faisal Faulkner MD  Total time spent on this visit was 30 minutes including talking to the patient, and reviewing imaging or other medical records.      Thank you for allowing me to participate in the care of your patient.      Sincerely,     Faisal Faulkner MD, MD     [facility]  cc:   Apolinar Hernández MD  [address] [place] [address]

## 2023-06-29 DIAGNOSIS — E78.2 MIXED HYPERLIPIDEMIA: ICD-10-CM

## 2023-06-29 DIAGNOSIS — I25.10 CORONARY ARTERY DISEASE INVOLVING NATIVE CORONARY ARTERY OF NATIVE HEART WITHOUT ANGINA PECTORIS: ICD-10-CM

## 2023-06-29 RX ORDER — ROSUVASTATIN CALCIUM 20 MG/1
20 TABLET, COATED ORAL DAILY
Qty: 90 TABLET | Refills: 3 | Status: SHIPPED | OUTPATIENT
Start: 2023-06-29 | End: 2024-04-09

## 2023-07-15 NOTE — TELEPHONE ENCOUNTER
Received refill request for rosuvastatin 20 mg daily. Per telephone encounter on 1/8/19, pt to increase dose to 40 mg daily. Called pt to clarify, no answer. LVM requesting call back to Team 1 to clarify dose.   
show

## 2023-08-16 ENCOUNTER — MYC MEDICAL ADVICE (OUTPATIENT)
Dept: CARDIOLOGY | Facility: CLINIC | Age: 63
End: 2023-08-16
Payer: COMMERCIAL

## 2023-08-16 DIAGNOSIS — I25.10 CORONARY ARTERY DISEASE INVOLVING NATIVE CORONARY ARTERY OF NATIVE HEART WITHOUT ANGINA PECTORIS: Primary | ICD-10-CM

## 2023-08-16 DIAGNOSIS — I65.23 BILATERAL CAROTID ARTERY STENOSIS: ICD-10-CM

## 2023-08-16 DIAGNOSIS — E78.5 HYPERLIPIDEMIA LDL GOAL <70: ICD-10-CM

## 2023-08-17 RX ORDER — EZETIMIBE 10 MG/1
10 TABLET ORAL AT BEDTIME
Qty: 90 TABLET | Refills: 3 | Status: SHIPPED | OUTPATIENT
Start: 2023-08-17 | End: 2024-04-09

## 2023-08-17 NOTE — TELEPHONE ENCOUNTER
Received [employer] message requesting refill of Zetia 10 mg daily. [facility] Refill Guideline reviewed.  Medication meets criteria for refill.

## 2024-04-09 ENCOUNTER — TELEPHONE (OUTPATIENT)
Dept: CARDIOLOGY | Facility: CLINIC | Age: 64
End: 2024-04-09
Payer: COMMERCIAL

## 2024-04-09 DIAGNOSIS — I25.10 CORONARY ARTERY DISEASE INVOLVING NATIVE CORONARY ARTERY OF NATIVE HEART WITHOUT ANGINA PECTORIS: ICD-10-CM

## 2024-04-09 DIAGNOSIS — I10 BENIGN ESSENTIAL HYPERTENSION: ICD-10-CM

## 2024-04-09 DIAGNOSIS — E78.2 MIXED HYPERLIPIDEMIA: ICD-10-CM

## 2024-04-09 DIAGNOSIS — E78.5 HYPERLIPIDEMIA WITH TARGET LDL LESS THAN 130: ICD-10-CM

## 2024-04-09 DIAGNOSIS — E78.5 HYPERLIPIDEMIA LDL GOAL <70: ICD-10-CM

## 2024-04-09 RX ORDER — ROSUVASTATIN CALCIUM 20 MG/1
20 TABLET, COATED ORAL DAILY
Qty: 90 TABLET | Refills: 0 | Status: SHIPPED | OUTPATIENT
Start: 2024-04-09 | End: 2024-05-28

## 2024-04-09 RX ORDER — HYDRALAZINE HYDROCHLORIDE 50 MG/1
50 TABLET, FILM COATED ORAL 3 TIMES DAILY
Qty: 270 TABLET | Refills: 0 | Status: SHIPPED | OUTPATIENT
Start: 2024-04-09 | End: 2024-05-28

## 2024-04-09 RX ORDER — EZETIMIBE 10 MG/1
10 TABLET ORAL AT BEDTIME
Qty: 90 TABLET | Refills: 0 | Status: SHIPPED | OUTPATIENT
Start: 2024-04-09 | End: 2024-05-28

## 2024-04-09 RX ORDER — CARVEDILOL 6.25 MG/1
6.25 TABLET ORAL 2 TIMES DAILY WITH MEALS
Qty: 180 TABLET | Refills: 0 | Status: SHIPPED | OUTPATIENT
Start: 2024-04-09 | End: 2024-05-28

## 2024-04-09 RX ORDER — IRBESARTAN 150 MG/1
150 TABLET ORAL 2 TIMES DAILY
Qty: 180 TABLET | Refills: 0 | Status: SHIPPED | OUTPATIENT
Start: 2024-04-09

## 2024-04-09 NOTE — TELEPHONE ENCOUNTER
Patient requesting medication refills and MD recommendation for move to [place]. Patient moving next week and will be needing to make sure he has enough medication before establishing care in [place]. 3 month supply sent to patient preferred pharmacy. Patient given Dr. Adams with [facility] recommendation. Patient verbalized appreciation for care with Dr. Faulkner and Dr. Hernández and all care teams. Patient verbalized understanding and in agreement with care.

## 2024-04-09 NOTE — TELEPHONE ENCOUNTER
[facility] Call Center    Phone Message    May a detailed message be left on voicemail: yes     Reason for Call: Other: Pt would like a call back to discuss getting referral for new cardio in [place] and also getting a 3 month supply on medication until he can get a new cardio team      Action Taken: Other: Cardio    Travel Screening: Not Applicable

## 2024-05-26 ENCOUNTER — MYC MEDICAL ADVICE (OUTPATIENT)
Dept: CARDIOLOGY | Facility: CLINIC | Age: 64
End: 2024-05-26
Payer: COMMERCIAL

## 2024-05-26 ENCOUNTER — HEALTH MAINTENANCE LETTER (OUTPATIENT)
Age: 64
End: 2024-05-26

## 2024-05-26 DIAGNOSIS — E78.5 HYPERLIPIDEMIA LDL GOAL <70: ICD-10-CM

## 2024-05-26 DIAGNOSIS — I25.10 CORONARY ARTERY DISEASE INVOLVING NATIVE CORONARY ARTERY OF NATIVE HEART WITHOUT ANGINA PECTORIS: ICD-10-CM

## 2024-05-26 DIAGNOSIS — E78.2 MIXED HYPERLIPIDEMIA: ICD-10-CM

## 2024-05-26 DIAGNOSIS — E78.5 HYPERLIPIDEMIA WITH TARGET LDL LESS THAN 130: ICD-10-CM

## 2024-05-26 DIAGNOSIS — I10 BENIGN ESSENTIAL HYPERTENSION: ICD-10-CM

## 2024-05-28 RX ORDER — CARVEDILOL 6.25 MG/1
6.25 TABLET ORAL 2 TIMES DAILY WITH MEALS
Qty: 180 TABLET | Refills: 0 | Status: SHIPPED | OUTPATIENT
Start: 2024-05-28

## 2024-05-28 RX ORDER — HYDRALAZINE HYDROCHLORIDE 50 MG/1
50 TABLET, FILM COATED ORAL 3 TIMES DAILY
Qty: 270 TABLET | Refills: 0 | Status: SHIPPED | OUTPATIENT
Start: 2024-05-28

## 2024-05-28 RX ORDER — ROSUVASTATIN CALCIUM 20 MG/1
20 TABLET, COATED ORAL DAILY
Qty: 90 TABLET | Refills: 0 | Status: SHIPPED | OUTPATIENT
Start: 2024-05-28

## 2024-05-28 RX ORDER — EZETIMIBE 10 MG/1
10 TABLET ORAL AT BEDTIME
Qty: 90 TABLET | Refills: 0 | Status: SHIPPED | OUTPATIENT
Start: 2024-05-28

## 2024-07-12 ENCOUNTER — LAB REQUISITION (OUTPATIENT)
Dept: LAB | Facility: CLINIC | Age: 64
End: 2024-07-12
Payer: COMMERCIAL

## 2024-07-12 DIAGNOSIS — D48.5 NEOPLASM OF UNCERTAIN BEHAVIOR OF SKIN: ICD-10-CM

## 2024-07-12 PROCEDURE — 88305 TISSUE EXAM BY PATHOLOGIST: CPT | Mod: TC,ORL | Performed by: STUDENT IN AN ORGANIZED HEALTH CARE EDUCATION/TRAINING PROGRAM

## 2024-07-12 PROCEDURE — 88305 TISSUE EXAM BY PATHOLOGIST: CPT | Mod: 26 | Performed by: DERMATOLOGY

## 2024-07-18 LAB
PATH REPORT.COMMENTS IMP SPEC: NORMAL
PATH REPORT.FINAL DX SPEC: NORMAL
PATH REPORT.GROSS SPEC: NORMAL
PATH REPORT.MICROSCOPIC SPEC OTHER STN: NORMAL
PATH REPORT.RELEVANT HX SPEC: NORMAL
